# Patient Record
Sex: FEMALE | Race: WHITE | Employment: OTHER | URBAN - METROPOLITAN AREA
[De-identification: names, ages, dates, MRNs, and addresses within clinical notes are randomized per-mention and may not be internally consistent; named-entity substitution may affect disease eponyms.]

---

## 2017-12-20 ENCOUNTER — OP HISTORICAL/CONVERTED ENCOUNTER (OUTPATIENT)
Dept: OTHER | Age: 67
End: 2017-12-20

## 2017-12-28 ENCOUNTER — OP HISTORICAL/CONVERTED ENCOUNTER (OUTPATIENT)
Dept: OTHER | Age: 67
End: 2017-12-28

## 2018-01-10 ENCOUNTER — OP HISTORICAL/CONVERTED ENCOUNTER (OUTPATIENT)
Dept: OTHER | Age: 68
End: 2018-01-10

## 2018-01-29 ENCOUNTER — OP HISTORICAL/CONVERTED ENCOUNTER (OUTPATIENT)
Dept: OTHER | Age: 68
End: 2018-01-29

## 2018-01-31 ENCOUNTER — OP HISTORICAL/CONVERTED ENCOUNTER (OUTPATIENT)
Dept: OTHER | Age: 68
End: 2018-01-31

## 2021-12-28 RX ORDER — LOSARTAN POTASSIUM 50 MG/1
50 TABLET ORAL DAILY
COMMUNITY

## 2021-12-28 RX ORDER — HYDROCHLOROTHIAZIDE 12.5 MG/1
12.5 CAPSULE ORAL DAILY
COMMUNITY

## 2021-12-28 RX ORDER — TRAZODONE HYDROCHLORIDE 50 MG/1
50 TABLET ORAL
COMMUNITY
End: 2022-08-11

## 2021-12-28 RX ORDER — POTASSIUM CHLORIDE 750 MG/1
TABLET, FILM COATED, EXTENDED RELEASE ORAL
COMMUNITY
End: 2022-08-11

## 2021-12-28 RX ORDER — OMEPRAZOLE 40 MG/1
40 CAPSULE, DELAYED RELEASE ORAL DAILY
COMMUNITY

## 2021-12-28 RX ORDER — BISOPROLOL FUMARATE 5 MG/1
5 TABLET ORAL DAILY
COMMUNITY
End: 2022-08-11

## 2021-12-28 RX ORDER — FLUCONAZOLE 100 MG/1
100 TABLET ORAL DAILY
COMMUNITY
End: 2022-08-11

## 2021-12-28 RX ORDER — LORATADINE 10 MG/1
10 TABLET ORAL
COMMUNITY
End: 2022-08-11

## 2021-12-28 RX ORDER — CITALOPRAM 40 MG/1
40 TABLET, FILM COATED ORAL DAILY
COMMUNITY

## 2021-12-30 ENCOUNTER — HOSPITAL ENCOUNTER (OUTPATIENT)
Dept: PREADMISSION TESTING | Age: 71
Discharge: HOME | End: 2021-12-30
Payer: MEDICARE

## 2021-12-30 LAB — SARS-COV-2, COV2: NORMAL

## 2021-12-30 PROCEDURE — U0005 INFEC AGEN DETEC AMPLI PROBE: HCPCS

## 2022-01-01 LAB
SARS-COV-2, XPLCVT: NOT DETECTED
SOURCE, COVRS: NORMAL

## 2022-01-04 ENCOUNTER — HOSPITAL ENCOUNTER (OUTPATIENT)
Age: 72
Setting detail: OUTPATIENT SURGERY
Discharge: HOME | End: 2022-01-04
Attending: INTERNAL MEDICINE | Admitting: INTERNAL MEDICINE
Payer: MEDICARE

## 2022-01-04 ENCOUNTER — APPOINTMENT (OUTPATIENT)
Dept: ENDOSCOPY | Age: 72
End: 2022-01-04
Attending: INTERNAL MEDICINE
Payer: MEDICARE

## 2022-01-04 ENCOUNTER — ANESTHESIA EVENT (OUTPATIENT)
Dept: ENDOSCOPY | Age: 72
End: 2022-01-04
Payer: MEDICARE

## 2022-01-04 ENCOUNTER — ANESTHESIA (OUTPATIENT)
Dept: ENDOSCOPY | Age: 72
End: 2022-01-04
Payer: MEDICARE

## 2022-01-04 VITALS
SYSTOLIC BLOOD PRESSURE: 123 MMHG | WEIGHT: 162 LBS | HEART RATE: 70 BPM | DIASTOLIC BLOOD PRESSURE: 75 MMHG | TEMPERATURE: 97.7 F | RESPIRATION RATE: 16 BRPM | BODY MASS INDEX: 28.7 KG/M2 | HEIGHT: 63 IN | OXYGEN SATURATION: 98 %

## 2022-01-04 PROCEDURE — 76040000007: Performed by: INTERNAL MEDICINE

## 2022-01-04 PROCEDURE — 2709999900 HC NON-CHARGEABLE SUPPLY: Performed by: INTERNAL MEDICINE

## 2022-01-04 PROCEDURE — 77030021593 HC FCPS BIOP ENDOSC BSC -A: Performed by: INTERNAL MEDICINE

## 2022-01-04 PROCEDURE — 76060000032 HC ANESTHESIA 0.5 TO 1 HR: Performed by: INTERNAL MEDICINE

## 2022-01-04 PROCEDURE — 74011250636 HC RX REV CODE- 250/636: Performed by: NURSE ANESTHETIST, CERTIFIED REGISTERED

## 2022-01-04 PROCEDURE — 74011250636 HC RX REV CODE- 250/636: Performed by: INTERNAL MEDICINE

## 2022-01-04 PROCEDURE — 77030019988 HC FCPS ENDOSC DISP BSC -B: Performed by: INTERNAL MEDICINE

## 2022-01-04 PROCEDURE — 88305 TISSUE EXAM BY PATHOLOGIST: CPT

## 2022-01-04 RX ORDER — PROPOFOL 10 MG/ML
INJECTION, EMULSION INTRAVENOUS AS NEEDED
Status: DISCONTINUED | OUTPATIENT
Start: 2022-01-04 | End: 2022-01-04 | Stop reason: HOSPADM

## 2022-01-04 RX ORDER — SODIUM CHLORIDE, SODIUM LACTATE, POTASSIUM CHLORIDE, CALCIUM CHLORIDE 600; 310; 30; 20 MG/100ML; MG/100ML; MG/100ML; MG/100ML
50 INJECTION, SOLUTION INTRAVENOUS CONTINUOUS
Status: DISCONTINUED | OUTPATIENT
Start: 2022-01-04 | End: 2022-01-04 | Stop reason: HOSPADM

## 2022-01-04 RX ADMIN — PROPOFOL 100 MG: 10 INJECTION, EMULSION INTRAVENOUS at 09:31

## 2022-01-04 RX ADMIN — SODIUM CHLORIDE, POTASSIUM CHLORIDE, SODIUM LACTATE AND CALCIUM CHLORIDE 50 ML/HR: 600; 310; 30; 20 INJECTION, SOLUTION INTRAVENOUS at 08:56

## 2022-01-04 RX ADMIN — PROPOFOL 25 MG: 10 INJECTION, EMULSION INTRAVENOUS at 09:33

## 2022-01-04 NOTE — ANESTHESIA POSTPROCEDURE EVALUATION
Procedure(s):  ESOPHAGOGASTRODUODENOSCOPY (EGD)  ESOPHAGOGASTRODUODENAL (EGD) BIOPSY.     total IV anesthesia, MAC    Anesthesia Post Evaluation      Multimodal analgesia: multimodal analgesia not used between 6 hours prior to anesthesia start to PACU discharge  Patient location during evaluation: bedside  Patient participation: complete - patient participated  Level of consciousness: awake and alert  Pain score: 0  Pain management: adequate  Airway patency: patent  Anesthetic complications: no  Cardiovascular status: acceptable, hemodynamically stable and blood pressure returned to baseline  Respiratory status: acceptable, nonlabored ventilation, spontaneous ventilation, unassisted and room air  Hydration status: acceptable  Post anesthesia nausea and vomiting:  none  Final Post Anesthesia Temperature Assessment:  Normothermia (36.0-37.5 degrees C)      INITIAL Post-op Vital signs:   Vitals Value Taken Time   /75 01/04/22 1015   Temp     Pulse 70 01/04/22 1015   Resp 16 01/04/22 1015   SpO2 98 % 01/04/22 1015

## 2022-01-04 NOTE — ANESTHESIA PREPROCEDURE EVALUATION
Relevant Problems   No relevant active problems       Anesthetic History   No history of anesthetic complications            Review of Systems / Medical History  Patient summary reviewed, nursing notes reviewed and pertinent labs reviewed    Pulmonary        Sleep apnea (uses a mouth piece)        Comments: Chronic Bronchitis   Neuro/Psych         Psychiatric history     Cardiovascular    Hypertension                   GI/Hepatic/Renal     GERD           Endo/Other  Within defined limits           Other Findings              Physical Exam    Airway  Mallampati: III  TM Distance: 4 - 6 cm  Neck ROM: normal range of motion   Mouth opening: Normal     Cardiovascular    Rhythm: regular  Rate: normal         Dental  No notable dental hx       Pulmonary  Breath sounds clear to auscultation               Abdominal  GI exam deferred       Other Findings            Anesthetic Plan    ASA: 3  Anesthesia type: total IV anesthesia and MAC          Induction: Intravenous  Anesthetic plan and risks discussed with: Patient

## 2022-01-13 NOTE — PROGRESS NOTES
\"I am actually feeling better since I have had the procedure and I am following a diet for gastritis. I have a follow up on the 18th of this month. \"

## 2022-06-07 ENCOUNTER — ANESTHESIA (OUTPATIENT)
Dept: ENDOSCOPY | Age: 72
End: 2022-06-07
Payer: MEDICARE

## 2022-06-07 ENCOUNTER — APPOINTMENT (OUTPATIENT)
Dept: ENDOSCOPY | Age: 72
End: 2022-06-07
Attending: INTERNAL MEDICINE
Payer: MEDICARE

## 2022-06-07 ENCOUNTER — HOSPITAL ENCOUNTER (OUTPATIENT)
Age: 72
Setting detail: OUTPATIENT SURGERY
Discharge: HOME | End: 2022-06-07
Attending: INTERNAL MEDICINE | Admitting: INTERNAL MEDICINE
Payer: MEDICARE

## 2022-06-07 ENCOUNTER — ANESTHESIA EVENT (OUTPATIENT)
Dept: ENDOSCOPY | Age: 72
End: 2022-06-07
Payer: MEDICARE

## 2022-06-07 VITALS
OXYGEN SATURATION: 98 % | SYSTOLIC BLOOD PRESSURE: 112 MMHG | RESPIRATION RATE: 16 BRPM | WEIGHT: 165 LBS | DIASTOLIC BLOOD PRESSURE: 56 MMHG | TEMPERATURE: 97.9 F | BODY MASS INDEX: 30.36 KG/M2 | HEIGHT: 62 IN | HEART RATE: 68 BPM

## 2022-06-07 PROCEDURE — 76040000007: Performed by: INTERNAL MEDICINE

## 2022-06-07 PROCEDURE — 74011000250 HC RX REV CODE- 250

## 2022-06-07 PROCEDURE — 2709999900 HC NON-CHARGEABLE SUPPLY: Performed by: INTERNAL MEDICINE

## 2022-06-07 PROCEDURE — 76060000032 HC ANESTHESIA 0.5 TO 1 HR: Performed by: INTERNAL MEDICINE

## 2022-06-07 RX ORDER — SODIUM CHLORIDE 9 MG/ML
25 INJECTION, SOLUTION INTRAVENOUS CONTINUOUS
Status: DISCONTINUED | OUTPATIENT
Start: 2022-06-07 | End: 2022-06-07 | Stop reason: HOSPADM

## 2022-06-07 RX ORDER — LIDOCAINE HYDROCHLORIDE 20 MG/ML
INJECTION, SOLUTION EPIDURAL; INFILTRATION; INTRACAUDAL; PERINEURAL AS NEEDED
Status: DISCONTINUED | OUTPATIENT
Start: 2022-06-07 | End: 2022-06-07 | Stop reason: HOSPADM

## 2022-06-07 RX ORDER — SODIUM CHLORIDE, SODIUM LACTATE, POTASSIUM CHLORIDE, CALCIUM CHLORIDE 600; 310; 30; 20 MG/100ML; MG/100ML; MG/100ML; MG/100ML
75 INJECTION, SOLUTION INTRAVENOUS CONTINUOUS
Status: DISCONTINUED | OUTPATIENT
Start: 2022-06-07 | End: 2022-06-07 | Stop reason: HOSPADM

## 2022-06-07 RX ADMIN — LIDOCAINE HYDROCHLORIDE 10 MG: 20 INJECTION, SOLUTION EPIDURAL; INFILTRATION; INTRACAUDAL; PERINEURAL at 09:06

## 2022-06-07 RX ADMIN — LIDOCAINE HYDROCHLORIDE 10 MG: 20 INJECTION, SOLUTION EPIDURAL; INFILTRATION; INTRACAUDAL; PERINEURAL at 09:02

## 2022-06-07 RX ADMIN — LIDOCAINE HYDROCHLORIDE 100 MG: 20 INJECTION, SOLUTION EPIDURAL; INFILTRATION; INTRACAUDAL; PERINEURAL at 08:56

## 2022-06-07 RX ADMIN — LIDOCAINE HYDROCHLORIDE 10 MG: 20 INJECTION, SOLUTION EPIDURAL; INFILTRATION; INTRACAUDAL; PERINEURAL at 09:00

## 2022-06-07 RX ADMIN — LIDOCAINE HYDROCHLORIDE 10 MG: 20 INJECTION, SOLUTION EPIDURAL; INFILTRATION; INTRACAUDAL; PERINEURAL at 09:01

## 2022-06-07 RX ADMIN — LIDOCAINE HYDROCHLORIDE 10 MG: 20 INJECTION, SOLUTION EPIDURAL; INFILTRATION; INTRACAUDAL; PERINEURAL at 08:58

## 2022-06-07 NOTE — ANESTHESIA PREPROCEDURE EVALUATION
Relevant Problems   No relevant active problems       Anesthetic History   No history of anesthetic complications            Review of Systems / Medical History  Patient summary reviewed, nursing notes reviewed and pertinent labs reviewed    Pulmonary        Sleep apnea (uses a mouth piece)    Asthma     Comments: Chronic Bronchitis   Neuro/Psych         Psychiatric history     Cardiovascular    Hypertension                   GI/Hepatic/Renal     GERD           Endo/Other        Obesity     Other Findings            Past Medical History:   Diagnosis Date    Asthma     COVID 06/2021    Depression     GERD (gastroesophageal reflux disease)     H/O gastric bypass     Hypertension     Ill-defined condition     BRADYCARDIA    Pancreatitis     Skin cancer     precancerous    Sleep apnea        Past Surgical History:   Procedure Laterality Date    HX CHOLECYSTECTOMY      HX GI  2015    colonoscopy    HX GYN      partial hysterectomy    HX HERNIA REPAIR      HX UROLOGICAL         Current Outpatient Medications   Medication Instructions    bisoprolol (ZEBETA) 5 mg, Oral, DAILY    citalopram (CELEXA) 20 mg, Oral, DAILY    fluconazole (DIFLUCAN) 100 mg, DAILY    hydroCHLOROthiazide (HYDRODIURIL) 12.5 mg, Oral, DAILY    loratadine (CLARITIN) 10 mg    losartan (COZAAR) 50 mg, Oral, DAILY    omeprazole (PRILOSEC) 40 mg, Oral, DAILY    potassium chloride SR (Klor-Con 10) 10 mEq tablet Take  by mouth.  traZODone (DESYREL) 50 mg, Oral, EVERY BEDTIME       No current facility-administered medications for this encounter.        Patient Vitals for the past 24 hrs:   Temp Pulse Resp BP SpO2   06/07/22 0757 36.9 °C (98.5 °F) 65 20 138/67 99 %       No results found for: WBC, WBCLT, HGBPOC, HGB, HGBP, HCTPOC, HCT, PHCT, RBCH, PLT, MCV, HGBEXT, HCTEXT, PLTEXT  No results found for: NA, K, CL, CO2, AGAP, GLU, BUN, CREA, BUCR, GFRAA, GFRNA, CA, GFRAA  No results found for: APTT, PTP, INR, INREXT  No results found for: GLU, GLUCPOC  Physical Exam    Airway  Mallampati: III  TM Distance: 4 - 6 cm  Neck ROM: normal range of motion   Mouth opening: Normal     Cardiovascular    Rhythm: regular  Rate: normal         Dental  No notable dental hx       Pulmonary  Breath sounds clear to auscultation               Abdominal  GI exam deferred       Other Findings            Anesthetic Plan    ASA: 3  Anesthesia type: total IV anesthesia and MAC          Induction: Intravenous  Anesthetic plan and risks discussed with: Patient and Family

## 2022-06-07 NOTE — ROUTINE PROCESS
Pt recovered on unit. Reviewed AVS and pt able to teach back how to start a high fiber diet. Removed pt's IV with no complications. Pt discharged home safely with family.

## 2022-06-07 NOTE — ANESTHESIA POSTPROCEDURE EVALUATION
Procedure(s):  COLONOSCOPY.    total IV anesthesia, MAC    Anesthesia Post Evaluation      Multimodal analgesia: multimodal analgesia not used between 6 hours prior to anesthesia start to PACU discharge  Patient location during evaluation: bedside (Endoscopy suite)  Patient participation: complete - patient cannot participate  Level of consciousness: sleepy but conscious  Pain score: 0  Pain management: adequate  Airway patency: patent  Anesthetic complications: no  Cardiovascular status: acceptable  Respiratory status: acceptable and nasal cannula  Hydration status: acceptable  Comments: This patient remained on the stretcher. The patient was handed off to the endoscopy nursing team.  All questions regarding pre-, intra-, and postoperative care were answered.   Post anesthesia nausea and vomiting:  none      INITIAL Post-op Vital signs:   Vitals Value Taken Time   /57 06/07/22 0907   Temp 36.1 °C (97 °F) 06/07/22 0907   Pulse 56 06/07/22 0907   Resp 17 06/07/22 0907   SpO2 98 % 06/07/22 0907

## 2022-08-11 ENCOUNTER — APPOINTMENT (OUTPATIENT)
Dept: ULTRASOUND IMAGING | Age: 72
DRG: 446 | End: 2022-08-11
Attending: NURSE PRACTITIONER
Payer: MEDICARE

## 2022-08-11 ENCOUNTER — HOSPITAL ENCOUNTER (INPATIENT)
Age: 72
LOS: 2 days | Discharge: HOME | DRG: 446 | End: 2022-08-13
Attending: EMERGENCY MEDICINE | Admitting: INTERNAL MEDICINE
Payer: MEDICARE

## 2022-08-11 DIAGNOSIS — K83.8 COMMON BILE DUCT DILATATION: ICD-10-CM

## 2022-08-11 DIAGNOSIS — R10.11 RUQ PAIN: Primary | ICD-10-CM

## 2022-08-11 LAB
ABO + RH BLD: NORMAL
ALBUMIN SERPL-MCNC: 3 G/DL (ref 3.5–5)
ALBUMIN/GLOB SERPL: 0.9 {RATIO} (ref 1.1–2.2)
ALP SERPL-CCNC: 138 U/L (ref 45–117)
ALT SERPL-CCNC: 49 U/L (ref 12–78)
ANION GAP SERPL CALC-SCNC: 2 MMOL/L (ref 5–15)
AST SERPL W P-5'-P-CCNC: 43 U/L (ref 15–37)
ATRIAL RATE: 60 BPM
BASOPHILS # BLD: 0 K/UL (ref 0–0.1)
BASOPHILS NFR BLD: 1 % (ref 0–1)
BILIRUB SERPL-MCNC: 0.4 MG/DL (ref 0.2–1)
BLOOD GROUP ANTIBODIES SERPL: NEGATIVE
BUN SERPL-MCNC: 17 MG/DL (ref 6–20)
BUN/CREAT SERPL: 18 (ref 12–20)
CA-I BLD-MCNC: 8.8 MG/DL (ref 8.5–10.1)
CALCULATED P AXIS, ECG09: 78 DEGREES
CALCULATED R AXIS, ECG10: 54 DEGREES
CALCULATED T AXIS, ECG11: 54 DEGREES
CHLORIDE SERPL-SCNC: 109 MMOL/L (ref 97–108)
CO2 SERPL-SCNC: 31 MMOL/L (ref 21–32)
CREAT SERPL-MCNC: 0.94 MG/DL (ref 0.55–1.02)
DIAGNOSIS, 93000: NORMAL
DIFFERENTIAL METHOD BLD: ABNORMAL
EOSINOPHIL # BLD: 0.1 K/UL (ref 0–0.4)
EOSINOPHIL NFR BLD: 2 % (ref 0–7)
ERYTHROCYTE [DISTWIDTH] IN BLOOD BY AUTOMATED COUNT: 15.6 % (ref 11.5–14.5)
GLOBULIN SER CALC-MCNC: 3.3 G/DL (ref 2–4)
GLUCOSE SERPL-MCNC: 103 MG/DL (ref 65–100)
HCT VFR BLD AUTO: 31 % (ref 35–47)
HGB BLD-MCNC: 9.7 G/DL (ref 11.5–16)
IMM GRANULOCYTES # BLD AUTO: 0 K/UL (ref 0–0.04)
IMM GRANULOCYTES NFR BLD AUTO: 0 % (ref 0–0.5)
LIPASE SERPL-CCNC: 212 U/L (ref 73–393)
LYMPHOCYTES # BLD: 1.8 K/UL (ref 0.8–3.5)
LYMPHOCYTES NFR BLD: 40 % (ref 12–49)
MCH RBC QN AUTO: 25 PG (ref 26–34)
MCHC RBC AUTO-ENTMCNC: 31.3 G/DL (ref 30–36.5)
MCV RBC AUTO: 79.9 FL (ref 80–99)
MONOCYTES # BLD: 0.4 K/UL (ref 0–1)
MONOCYTES NFR BLD: 10 % (ref 5–13)
NEUTS SEG # BLD: 2.1 K/UL (ref 1.8–8)
NEUTS SEG NFR BLD: 47 % (ref 32–75)
NRBC # BLD: 0 K/UL (ref 0–0.01)
NRBC BLD-RTO: 0 PER 100 WBC
P-R INTERVAL, ECG05: 156 MS
PLATELET # BLD AUTO: 359 K/UL (ref 150–400)
PMV BLD AUTO: 10.5 FL (ref 8.9–12.9)
POTASSIUM SERPL-SCNC: 4.2 MMOL/L (ref 3.5–5.1)
PROT SERPL-MCNC: 6.3 G/DL (ref 6.4–8.2)
Q-T INTERVAL, ECG07: 446 MS
QRS DURATION, ECG06: 84 MS
QTC CALCULATION (BEZET), ECG08: 446 MS
RBC # BLD AUTO: 3.88 M/UL (ref 3.8–5.2)
SODIUM SERPL-SCNC: 142 MMOL/L (ref 136–145)
SPECIMEN EXP DATE BLD: NORMAL
TROPONIN-HIGH SENSITIVITY: 10 NG/L (ref 0–51)
TROPONIN-HIGH SENSITIVITY: 13 NG/L (ref 0–51)
VENTRICULAR RATE, ECG03: 60 BPM
WBC # BLD AUTO: 4.4 K/UL (ref 3.6–11)

## 2022-08-11 PROCEDURE — 86900 BLOOD TYPING SEROLOGIC ABO: CPT

## 2022-08-11 PROCEDURE — 84484 ASSAY OF TROPONIN QUANT: CPT

## 2022-08-11 PROCEDURE — 74011250637 HC RX REV CODE- 250/637: Performed by: PHYSICIAN ASSISTANT

## 2022-08-11 PROCEDURE — 94761 N-INVAS EAR/PLS OXIMETRY MLT: CPT

## 2022-08-11 PROCEDURE — 87506 IADNA-DNA/RNA PROBE TQ 6-11: CPT

## 2022-08-11 PROCEDURE — 93005 ELECTROCARDIOGRAM TRACING: CPT

## 2022-08-11 PROCEDURE — 74011000250 HC RX REV CODE- 250: Performed by: PHYSICIAN ASSISTANT

## 2022-08-11 PROCEDURE — 83690 ASSAY OF LIPASE: CPT

## 2022-08-11 PROCEDURE — 80053 COMPREHEN METABOLIC PANEL: CPT

## 2022-08-11 PROCEDURE — 36415 COLL VENOUS BLD VENIPUNCTURE: CPT

## 2022-08-11 PROCEDURE — 87177 OVA AND PARASITES SMEARS: CPT

## 2022-08-11 PROCEDURE — 99285 EMERGENCY DEPT VISIT HI MDM: CPT

## 2022-08-11 PROCEDURE — 65270000029 HC RM PRIVATE

## 2022-08-11 PROCEDURE — 76705 ECHO EXAM OF ABDOMEN: CPT

## 2022-08-11 PROCEDURE — 85025 COMPLETE CBC W/AUTO DIFF WBC: CPT

## 2022-08-11 RX ORDER — ONDANSETRON 2 MG/ML
4 INJECTION INTRAMUSCULAR; INTRAVENOUS
Status: DISCONTINUED | OUTPATIENT
Start: 2022-08-11 | End: 2022-08-13 | Stop reason: HOSPADM

## 2022-08-11 RX ORDER — HYDROCHLOROTHIAZIDE 25 MG/1
12.5 TABLET ORAL DAILY
Status: DISCONTINUED | OUTPATIENT
Start: 2022-08-12 | End: 2022-08-13 | Stop reason: HOSPADM

## 2022-08-11 RX ORDER — POLYETHYLENE GLYCOL 3350 17 G/17G
17 POWDER, FOR SOLUTION ORAL DAILY PRN
Status: DISCONTINUED | OUTPATIENT
Start: 2022-08-11 | End: 2022-08-13 | Stop reason: HOSPADM

## 2022-08-11 RX ORDER — ALBUTEROL SULFATE 90 UG/1
2 AEROSOL, METERED RESPIRATORY (INHALATION)
COMMUNITY

## 2022-08-11 RX ORDER — CITALOPRAM 20 MG/1
20 TABLET, FILM COATED ORAL DAILY
Status: DISCONTINUED | OUTPATIENT
Start: 2022-08-12 | End: 2022-08-13 | Stop reason: HOSPADM

## 2022-08-11 RX ORDER — SODIUM CHLORIDE 0.9 % (FLUSH) 0.9 %
5-40 SYRINGE (ML) INJECTION AS NEEDED
Status: DISCONTINUED | OUTPATIENT
Start: 2022-08-11 | End: 2022-08-13 | Stop reason: HOSPADM

## 2022-08-11 RX ORDER — ONDANSETRON 4 MG/1
4 TABLET, ORALLY DISINTEGRATING ORAL
Status: DISCONTINUED | OUTPATIENT
Start: 2022-08-11 | End: 2022-08-13 | Stop reason: HOSPADM

## 2022-08-11 RX ORDER — PANTOPRAZOLE SODIUM 40 MG/1
40 TABLET, DELAYED RELEASE ORAL DAILY
Status: DISCONTINUED | OUTPATIENT
Start: 2022-08-12 | End: 2022-08-13 | Stop reason: HOSPADM

## 2022-08-11 RX ORDER — ENOXAPARIN SODIUM 100 MG/ML
40 INJECTION SUBCUTANEOUS DAILY
Status: DISCONTINUED | OUTPATIENT
Start: 2022-08-12 | End: 2022-08-13 | Stop reason: HOSPADM

## 2022-08-11 RX ORDER — LOSARTAN POTASSIUM 50 MG/1
50 TABLET ORAL DAILY
Status: DISCONTINUED | OUTPATIENT
Start: 2022-08-12 | End: 2022-08-13 | Stop reason: HOSPADM

## 2022-08-11 RX ORDER — SODIUM CHLORIDE 0.9 % (FLUSH) 0.9 %
5-40 SYRINGE (ML) INJECTION EVERY 8 HOURS
Status: DISCONTINUED | OUTPATIENT
Start: 2022-08-11 | End: 2022-08-13 | Stop reason: HOSPADM

## 2022-08-11 RX ORDER — ACETAMINOPHEN 650 MG/1
650 SUPPOSITORY RECTAL
Status: DISCONTINUED | OUTPATIENT
Start: 2022-08-11 | End: 2022-08-13 | Stop reason: HOSPADM

## 2022-08-11 RX ORDER — TRAZODONE HYDROCHLORIDE 50 MG/1
50 TABLET ORAL
Status: DISCONTINUED | OUTPATIENT
Start: 2022-08-11 | End: 2022-08-12

## 2022-08-11 RX ORDER — ACETAMINOPHEN 325 MG/1
650 TABLET ORAL
Status: DISCONTINUED | OUTPATIENT
Start: 2022-08-11 | End: 2022-08-13 | Stop reason: HOSPADM

## 2022-08-11 RX ADMIN — SODIUM CHLORIDE, PRESERVATIVE FREE 10 ML: 5 INJECTION INTRAVENOUS at 15:45

## 2022-08-11 RX ADMIN — SODIUM CHLORIDE, PRESERVATIVE FREE 10 ML: 5 INJECTION INTRAVENOUS at 23:15

## 2022-08-11 RX ADMIN — ACETAMINOPHEN 650 MG: 325 TABLET, FILM COATED ORAL at 23:23

## 2022-08-11 NOTE — H&P
History and Physical    Patient: Robbi García MRN: [id number]  SSN: [id number]    YOB: 1950  Age: 66 y.o. Sex: female      Subjective:      Robbi García is a 66 y.o. female with a past medical history of hypertension, depression, GERD, history of gastric bypass surgery that presented to the emergency room on 8/11/2022 for right upper quadrant abdominal pain. Patient had a cholecystectomy and gastric bypass over 10 years ago. Patient says for approximately 1 month she has been having very loose watery diarrhea. She also admits to right upper quadrant pain and soreness that she thinks is related due to eating after 1 hour. She denied any fevers, chills, night sweats. She thinks eating aggravates it but is unsure. There is no alleviating factors. Patient is post to have a hemorrhoidectomy by Dr. Jillian Owen on 24th. In the ED abdominal ultrasound showed mildly dilated edition of the CBD with no evidence of stone. Laboratory data was significant for WBC of 9.7, lipase 212, bili total 0.4, ALT 49, AST 43, alkaline phosphatase 138. Troponin x1 negative.   It was recommended admission for GI evaluation and MRCP    Past Medical History:   Diagnosis Date    Asthma     COVID 06/2021    Depression     GERD (gastroesophageal reflux disease)     H/O gastric bypass     Hypertension     Ill-defined condition     BRADYCARDIA    Pancreatitis     Skin cancer     precancerous    Sleep apnea      Past Surgical History:   Procedure Laterality Date    COLONOSCOPY N/A 6/7/2022    COLONOSCOPY performed by [facility] at [facility] ENDOSCOPY    HX CHOLECYSTECTOMY      HX GI  2015    colonoscopy    HX GYN      partial hysterectomy    HX HERNIA REPAIR      HX UROLOGICAL        Family History   Problem Relation Age of Onset    Cancer Other         grandmother- colon cancer    Diabetes Other     Coronary Art Dis Other      Social History     Tobacco Use    Smoking status: Never    Smokeless tobacco: Never   Substance Use Topics Alcohol use: Yes     Comment: wine socially       Prior to Admission medications    Medication Sig Start Date End Date Taking? Authorizing Provider   traZODone (DESYREL) 50 mg tablet Take 50 mg by mouth nightly. Provider, Historical   losartan (COZAAR) 50 mg tablet Take 50 mg by mouth daily. Provider, Historical   hydroCHLOROthiazide (HYDRODIURIL) 12.5 mg tablet Take 12.5 mg by mouth daily. Provider, Historical   citalopram (CELEXA) 20 mg tablet Take 20 mg by mouth daily. Provider, Historical   bisoprolol (ZEBETA) 5 mg tablet Take 5 mg by mouth daily. Provider, Historical   omeprazole (PRILOSEC) 40 mg capsule Take 40 mg by mouth daily. Provider, Historical        Allergies   Allergen Reactions    Codeine Other (comments)     Pt states \"It causes her to have Pancreatitis\"       Review of Systems:  Constitutional: No fevers, No chills, ++ fatigue, ++ weakness  Eyes: No visual disturbance  Ears, Nose, Mouth, Throat, and Face: No nasal congestion, No sore throat  Respiratory: No cough, No sputum, No wheezing, No SOB  Cardiovascular: No chest pain, No lower extremity edema, No Palpitations   Gastrointestinal: No nausea, No vomiting, ++diarrhea, No constipation, ++abdominal pain  Genitourinary: No frequency, No dysuria, No hematuria  Integument/Breast: No rash, No skin lesion(s), No dryness  Musculoskeletal: No arthralgias, No neck pain, No back pain  Neurological: No headaches, No dizziness, No confusion,  No seizures  Behavioral/Psychiatric: No anxiety, No depression      Objective:     Vitals:    08/11/22 1235 08/11/22 1246 08/11/22 1330 08/11/22 1441   BP: 137/67  (!) 146/72 (!) 111/97   Pulse: 62  (!) 59 63   Resp: 20   20   Temp: 98.2 °F (36.8 °C)   98.2 °F (36.8 °C)   SpO2: 98% 100% 97% 100%   Weight:       Height:            Physical Exam:  General: alert, cooperative, no distress  Eye: conjunctivae/corneas clear. PERRL, EOM's intact.    Throat and Neck: normal and no erythema or exudates noted. No mass   Lung: clear to auscultation bilaterally  Heart: regular rate and rhythm,   Abdomen: soft, RUQ tender. Bowel sounds normal. No masses,  Extremities:  able to move all extremities normal, atraumatic  Skin: Normal.  Neurologic: AOx3. Cranial nerves 2-12 and sensation grossly intact. Psychiatric: non focal    Recent Results (from the past 24 hour(s))   CBC WITH AUTOMATED DIFF    Collection Time: 08/11/22 12:46 PM   Result Value Ref Range    WBC 4.4 3.6 - 11.0 K/uL    RBC 3.88 3.80 - 5.20 M/uL    HGB 9.7 (L) 11.5 - 16.0 g/dL    HCT 31.0 (L) 35.0 - 47.0 %    MCV 79.9 (L) 80.0 - 99.0 FL    MCH 25.0 (L) 26.0 - 34.0 PG    MCHC 31.3 30.0 - 36.5 g/dL    RDW 15.6 (H) 11.5 - 14.5 %    PLATELET 502 232 - 103 K/uL    MPV 10.5 8.9 - 12.9 FL    NRBC 0.0 0.0  WBC    ABSOLUTE NRBC 0.00 0.00 - 0.01 K/uL    NEUTROPHILS 47 32 - 75 %    LYMPHOCYTES 40 12 - 49 %    MONOCYTES 10 5 - 13 %    EOSINOPHILS 2 0 - 7 %    BASOPHILS 1 0 - 1 %    IMMATURE GRANULOCYTES 0 0 - 0.5 %    ABS. NEUTROPHILS 2.1 1.8 - 8.0 K/UL    ABS. LYMPHOCYTES 1.8 0.8 - 3.5 K/UL    ABS. MONOCYTES 0.4 0.0 - 1.0 K/UL    ABS. EOSINOPHILS 0.1 0.0 - 0.4 K/UL    ABS. BASOPHILS 0.0 0.0 - 0.1 K/UL    ABS. IMM. GRANS. 0.0 0.00 - 0.04 K/UL    DF AUTOMATED     METABOLIC PANEL, COMPREHENSIVE    Collection Time: 08/11/22 12:46 PM   Result Value Ref Range    Sodium 142 136 - 145 mmol/L    Potassium 4.2 3.5 - 5.1 mmol/L    Chloride 109 (H) 97 - 108 mmol/L    CO2 31 21 - 32 mmol/L    Anion gap 2 (L) 5 - 15 mmol/L    Glucose 103 (H) 65 - 100 mg/dL    BUN 17 6 - 20 mg/dL    Creatinine 0.94 0.55 - 1.02 mg/dL    BUN/Creatinine ratio 18 12 - 20      GFR est AA >60 >60 ml/min/1.73m2    GFR est non-AA 58 (L) >60 ml/min/1.73m2    Calcium 8.8 8.5 - 10.1 mg/dL    Bilirubin, total 0.4 0.2 - 1.0 mg/dL    AST (SGOT) 43 (H) 15 - 37 U/L    ALT (SGPT) 49 12 - 78 U/L    Alk.  phosphatase 138 (H) 45 - 117 U/L    Protein, total 6.3 (L) 6.4 - 8.2 g/dL    Albumin 3.0 (L) 3.5 - 5.0 g/dL    Globulin 3.3 2.0 - 4.0 g/dL    A-G Ratio 0.9 (L) 1.1 - 2.2     TYPE & SCREEN    Collection Time: 08/11/22 12:46 PM   Result Value Ref Range    Crossmatch Expiration 08/14/2022,2359     ABO/Rh(D) O Positive     Antibody screen Negative    LIPASE    Collection Time: 08/11/22 12:46 PM   Result Value Ref Range    Lipase 212 73 - 393 U/L   TROPONIN-HIGH SENSITIVITY    Collection Time: 08/11/22 12:46 PM   Result Value Ref Range    Troponin-High Sensitivity 10 0 - 51 ng/L       XR Results (maximum last 3): No results found for this or any previous visit. CT Results (maximum last 3): No results found for this or any previous visit. MRI Results (maximum last 3): No results found for this or any previous visit. Nuclear Medicine Results (maximum last 3): No results found for this or any previous visit. US Results (maximum last 3): Results from Hospital Encounter encounter on 08/11/22    US RUQ    Narrative  INDICATION: RUQ pain, dilated CBD on CT done last night    COMPARISON: CT abdomen/pelvis December 28, 2017    TECHNIQUE:  Routine ultrasound images of the RIGHT upper quadrant of the abdomen were  obtained. FINDINGS:  LIVER: No significant enlargement or evidence of parenchymal lesion. Liver  measures 15 cm longitudinal.  MAIN PORTAL VEIN: Patent with appropriate hepatopetal flow direction. GALLBLADDER: Patient is status post cholecystectomy. COMMON BILE DUCT: 1.1 centimeters in diameter. No evidence of CBD stone is  given. PANCREAS: Not well visualized due to bowel gas though visualized portions are  unremarkable. RIGHT KIDNEY: 11.1-3.8% 3.8 cm in length. No hydronephrosis or nephrolithiasis. OTHER: The IVC and the aorta are normal in appearance. The distal aorta measures  1.4 cm diameter. Impression  1. Mild dilatation of the CBD which may be related to the history of  cholecystectomy. No evidence of CBD stone is given.     2. Normal sonographic appearance of the liver and the RIGHT kidney. Assessment:   1. Dilated common bile duct/right upper quadrant abdominal pain  2. Hypertension with hypotensive episodes  3. GERD  4. Depression/anxiety    Plan:   1. Abdominal ultrasound showed dilated common bile duct with no stone. Bili total within normal limits. Liver function tests are slightly elevated with an AST of 43 and alkaline phosphatase of 138. Lipase within normal limits. We will get an MRCP. Gastroenterology consulted. We will send for stool samples  2. Blood pressure is on the low to normal side and bradycardic. We will hold patient's beta-blocker. We will continue with losartan and hydrochlorothiazide with holding parameters. Continue to monitor per unit protocol. On cardiac telemetry. Initial troponin negative. Will get EKG. Cycle 3 more sets. 3.  Prilosec  4. Celexa  5. CBC, CMP in a.m.       Full code    DVT prophylaxis Lovenox  GI prophylaxis Prilosec    Total time: 61 min     Signed By: Bruce Simeon PA-C     August 11, 2022

## 2022-08-11 NOTE — ED TRIAGE NOTES
Patient is a transfer from [facility] emergency department started with right upper quadrant abdominal pain, been going on for couple weeks. Sent for admission. Gerd related complaint, ercp, GI. Paperwork on chart from other facility.

## 2022-08-11 NOTE — PROGRESS NOTES
Admission Medication Reconciliation:    Information obtained from:  Pharmacy [facility])    Comments/Recommendations: Reviewed PTA medications and patient's allergies. Removed bisoprolol 5 mg  Removed trazodone 50 mg        Allergies:  Codeine    Significant PMH/Disease States:   Past Medical History:   Diagnosis Date    Asthma     COVID 06/2021    Depression     GERD (gastroesophageal reflux disease)     H/O gastric bypass     Hypertension     Ill-defined condition     BRADYCARDIA    Pancreatitis     Skin cancer     precancerous    Sleep apnea      Chief Complaint for this Admission:    Chief Complaint   Patient presents with    Abdominal Pain     Prior to Admission Medications:   Prior to Admission Medications   Prescriptions Last Dose Informant Patient Reported? Taking? albuterol (PROVENTIL HFA, VENTOLIN HFA, PROAIR HFA) 90 mcg/actuation inhaler  Other Yes Yes   Sig: Take 2 Puffs by inhalation every six (6) hours as needed for Wheezing. citalopram (CELEXA) 40 mg tablet  Other Yes Yes   Sig: Take 40 mg by mouth in the morning. hydroCHLOROthiazide (MICROZIDE) 12.5 mg capsule  Other Yes Yes   Sig: Take 12.5 mg by mouth daily. losartan (COZAAR) 50 mg tablet  Other Yes Yes   Sig: Take 50 mg by mouth daily. omeprazole (PRILOSEC) 40 mg capsule  Other Yes Yes   Sig: Take 40 mg by mouth daily.       Facility-Administered Medications: None       Rivka Mancini

## 2022-08-11 NOTE — ED PROVIDER NOTES
EMERGENCY DEPARTMENT HISTORY AND PHYSICAL EXAM      Date: 8/11/2022  Patient Name: Obed Fairbanks    History of Presenting Illness     Chief Complaint   Patient presents with    Abdominal Pain       History Provided By: Patient    HPI: Obed Fairbanks, 66 y.o. female with a significant past medical history of cholecystectomy 12 years ago and other history as reviewed and listed below presents to the ED transfer from [facility] for right upper quadrant pain, dilated CBD for MRCP. Patient was accepted last night for right upper quadrant pain with abnormal findings on CT of dilated common bile duct. She states that she has been having right upper quadrant pain occurring approximately an hour after eating. She currently does not have any pain except for on deep palpation, and denies nausea or vomiting, chest pain or shortness of breath. She sees  for GI and Dr. Yanira Chang did her cholecystectomy as well as her gastric bypass. There are no other complaints, changes, or physical findings at this time. PCP: Chary Thomson NP    No current facility-administered medications on file prior to encounter. Current Outpatient Medications on File Prior to Encounter   Medication Sig Dispense Refill    traZODone (DESYREL) 50 mg tablet Take 50 mg by mouth nightly. fluconazole (DIFLUCAN) 100 mg tablet Take 100 mg by mouth daily. FDA advises cautious prescribing of oral fluconazole in pregnancy. (Patient not taking: Reported on 1/4/2022)      losartan (COZAAR) 50 mg tablet Take 50 mg by mouth daily. potassium chloride SR (Klor-Con 10) 10 mEq tablet Take  by mouth. (Patient not taking: Reported on 1/4/2022)      hydroCHLOROthiazide (HYDRODIURIL) 12.5 mg tablet Take 12.5 mg by mouth daily. loratadine (Claritin) 10 mg tablet Take 10 mg by mouth. (Patient not taking: Reported on 1/4/2022)      citalopram (CELEXA) 20 mg tablet Take 20 mg by mouth daily.       bisoprolol (ZEBETA) 5 mg tablet Take 5 mg by mouth daily. omeprazole (PRILOSEC) 40 mg capsule Take 40 mg by mouth daily. Past History     Past Medical History:  Past Medical History:   Diagnosis Date    Asthma     COVID 06/2021    Depression     GERD (gastroesophageal reflux disease)     H/O gastric bypass     Hypertension     Ill-defined condition     BRADYCARDIA    Pancreatitis     Skin cancer     precancerous    Sleep apnea        Past Surgical History:  Past Surgical History:   Procedure Laterality Date    COLONOSCOPY N/A 6/7/2022    COLONOSCOPY performed by Danna Mejía MD at [address] [place] [address] ENDOSCOPY    HX CHOLECYSTECTOMY      HX GI  2015    colonoscopy    HX GYN      partial hysterectomy    HX HERNIA REPAIR      HX UROLOGICAL         Family History:  Family History   Problem Relation Age of Onset    Cancer Other         grandmother- colon cancer    Diabetes Other     Coronary Art Dis Other        Social History:  Social History     Tobacco Use    Smoking status: Never    Smokeless tobacco: Never   Substance Use Topics    Alcohol use: Yes     Comment: wine socially     Drug use: Never       Allergies: Allergies   Allergen Reactions    Codeine Other (comments)     Pt states \"It causes her to have Pancreatitis\"       Review of Systems   Review of Systems   Constitutional: Negative. HENT: Negative. Respiratory: Negative. Cardiovascular: Negative. Gastrointestinal:  Positive for abdominal pain. Genitourinary: Negative. Musculoskeletal: Negative. Skin: Negative. Neurological: Negative. Hematological: Negative. Psychiatric/Behavioral: Negative. All other systems reviewed and are negative. Physical Exam   Physical Exam  Vitals and nursing note reviewed. Constitutional:       General: She is not in acute distress. Appearance: She is well-developed and normal weight. She is not ill-appearing, toxic-appearing or diaphoretic. HENT:      Head: Normocephalic.       Mouth/Throat:      Mouth: Mucous membranes are moist.   Eyes:      General: No scleral icterus. Cardiovascular:      Rate and Rhythm: Normal rate and regular rhythm. Heart sounds: Normal heart sounds. Pulmonary:      Effort: Pulmonary effort is normal. No respiratory distress. Breath sounds: Normal breath sounds. Abdominal:      General: Abdomen is flat. Bowel sounds are normal.      Palpations: Abdomen is soft. Tenderness: There is abdominal tenderness in the right upper quadrant. There is no guarding. Negative signs include Hodges's sign. Skin:     General: Skin is warm and dry. Capillary Refill: Capillary refill takes less than 2 seconds. Neurological:      General: No focal deficit present. Mental Status: She is alert and oriented to person, place, and time. Psychiatric:         Behavior: Behavior normal.     Lab and Diagnostic Study Results   Labs -     Recent Results (from the past 12 hour(s))   CBC WITH AUTOMATED DIFF    Collection Time: 08/11/22 12:46 PM   Result Value Ref Range    WBC 4.4 3.6 - 11.0 K/uL    RBC 3.88 3.80 - 5.20 M/uL    HGB 9.7 (L) 11.5 - 16.0 g/dL    HCT 31.0 (L) 35.0 - 47.0 %    MCV 79.9 (L) 80.0 - 99.0 FL    MCH 25.0 (L) 26.0 - 34.0 PG    MCHC 31.3 30.0 - 36.5 g/dL    RDW 15.6 (H) 11.5 - 14.5 %    PLATELET 572 163 - 315 K/uL    MPV 10.5 8.9 - 12.9 FL    NRBC 0.0 0.0  WBC    ABSOLUTE NRBC 0.00 0.00 - 0.01 K/uL    NEUTROPHILS 47 32 - 75 %    LYMPHOCYTES 40 12 - 49 %    MONOCYTES 10 5 - 13 %    EOSINOPHILS 2 0 - 7 %    BASOPHILS 1 0 - 1 %    IMMATURE GRANULOCYTES 0 0 - 0.5 %    ABS. NEUTROPHILS 2.1 1.8 - 8.0 K/UL    ABS. LYMPHOCYTES 1.8 0.8 - 3.5 K/UL    ABS. MONOCYTES 0.4 0.0 - 1.0 K/UL    ABS. EOSINOPHILS 0.1 0.0 - 0.4 K/UL    ABS. BASOPHILS 0.0 0.0 - 0.1 K/UL    ABS. IMM.  GRANS. 0.0 0.00 - 0.04 K/UL    DF AUTOMATED     METABOLIC PANEL, COMPREHENSIVE    Collection Time: 08/11/22 12:46 PM   Result Value Ref Range    Sodium 142 136 - 145 mmol/L    Potassium 4.2 3.5 - 5.1 mmol/L Chloride 109 (H) 97 - 108 mmol/L    CO2 31 21 - 32 mmol/L    Anion gap 2 (L) 5 - 15 mmol/L    Glucose 103 (H) 65 - 100 mg/dL    BUN 17 6 - 20 mg/dL    Creatinine 0.94 0.55 - 1.02 mg/dL    BUN/Creatinine ratio 18 12 - 20      GFR est AA >60 >60 ml/min/1.73m2    GFR est non-AA 58 (L) >60 ml/min/1.73m2    Calcium 8.8 8.5 - 10.1 mg/dL    Bilirubin, total 0.4 0.2 - 1.0 mg/dL    AST (SGOT) 43 (H) 15 - 37 U/L    ALT (SGPT) 49 12 - 78 U/L    Alk. phosphatase 138 (H) 45 - 117 U/L    Protein, total 6.3 (L) 6.4 - 8.2 g/dL    Albumin 3.0 (L) 3.5 - 5.0 g/dL    Globulin 3.3 2.0 - 4.0 g/dL    A-G Ratio 0.9 (L) 1.1 - 2.2     TYPE & SCREEN    Collection Time: 08/11/22 12:46 PM   Result Value Ref Range    Crossmatch Expiration 08/14/2022,2359     ABO/Rh(D) Lorrie Cogan Positive     Antibody screen Negative    LIPASE    Collection Time: 08/11/22 12:46 PM   Result Value Ref Range    Lipase 212 73 - 393 U/L   TROPONIN-HIGH SENSITIVITY    Collection Time: 08/11/22 12:46 PM   Result Value Ref Range    Troponin-High Sensitivity 10 0 - 51 ng/L       Radiologic Studies -   @[email or web address]@  CT Results  (Last 48 hours)      None          CXR Results  (Last 48 hours)      None            Medical Decision Making and ED Course   - I am the first provider for this patient. I reviewed the vital signs, available nursing notes, past medical history, past surgical history, family history and social history. - Initial assessment performed. The patients presenting problems have been discussed, and they are in agreement with the care plan formulated and outlined with them. I have encouraged them to ask questions as they arise throughout their visit. Vital Signs-Reviewed the patient's vital signs.   Patient Vitals for the past 12 hrs:   Temp Pulse Resp BP SpO2   08/11/22 1441 98.2 °F (36.8 °C) 63 20 (!) 111/97 100 %   08/11/22 1330 -- (!) 59 -- (!) 146/72 97 %   08/11/22 1246 -- -- -- -- 100 %   08/11/22 1235 98.2 °F (36.8 °C) 62 20 137/67 98 % Differential Diagnosis & Medical Decision Making Provider Note:   17-year-old female presents as transfer for right upper quadrant pain. Differential diagnoses include, bile duct stone, pancreatitis, gastritis which are consistent with her postprandial reports of pain. Less likely cardiac in nature but will check EKG and troponin as patient is female over the age of 48. So we will check right upper quadrant ultrasound and repeat labs were done upon arrival after triage    ED Course:   ED Course as of 08/11/22 1523   Thu Aug 11, 2022   1521 Additional labs resulted and EKG reviewed by Dr. Akosua Santos. RUQ US resulted. Dr. Mark Ren, Hospitalist, consulted for admission with GI consult Dr. Kylee Cross who was notified upon patient's arrival and patient to have MRCP as inpatient. [KR]      ED Course User Index  [KR] Caryle Bevel, NP     Disposition   Disposition: Condition stable  Admitted to Floor Medical Floor the case was discussed with the admitting physician Dr. Mark Ren and Hospitalist services. Decision to Admit         Diagnosis/Clinical Impression     Clinical Impression:   1. RUQ pain    2. Common bile duct dilatation        Attestations: Winston BERKOWITZ NP, am the primary clinician of record. Please note that this dictation was completed with Tapomat, the computer voice recognition software. Quite often unanticipated grammatical, syntax, homophones, and other interpretive errors are inadvertently transcribed by the computer software. Please disregard these errors. Please excuse any errors that have escaped final proofreading. Thank you.

## 2022-08-11 NOTE — PROGRESS NOTES
Reason for Admission:  RUQ Pain                     RUR Score:   7%                  Plan for utilizing home health: Not at this time         PCP: First and Last name:  Brian Hernandez NP     Name of Practice:    Are you a current patient: Yes/No:    Approximate date of last visit: 2 months ago   Can you participate in a virtual visit with your PCP:                     Current Advanced Directive/Advance Care Plan: Full Code      Healthcare Decision Maker:   Click here to complete [address] including selection of the Healthcare Decision Maker Relationship (ie \"Primary\")      Zulma Wang, , [phone]                       Transition of Care Plan:      Met f/f with Pt, she confirmed that the information on the face sheet is correct. Pt stated that she lives with her . Pt stated no HH, no DME and independent with ADL    Pt stated that she uses [address] in [address] [place] [address] stated that family will give her a ride home when she is D/C. CM dispo: home with no needs at this time.

## 2022-08-12 ENCOUNTER — APPOINTMENT (OUTPATIENT)
Dept: MRI IMAGING | Age: 72
DRG: 446 | End: 2022-08-12
Attending: PHYSICIAN ASSISTANT
Payer: MEDICARE

## 2022-08-12 LAB
ALBUMIN SERPL-MCNC: 3 G/DL (ref 3.5–5)
ALBUMIN/GLOB SERPL: 1.1 {RATIO} (ref 1.1–2.2)
ALP SERPL-CCNC: 130 U/L (ref 45–117)
ALT SERPL-CCNC: 41 U/L (ref 12–78)
ANION GAP SERPL CALC-SCNC: 5 MMOL/L (ref 5–15)
AST SERPL W P-5'-P-CCNC: 31 U/L (ref 15–37)
ATRIAL RATE: 75 BPM
BASOPHILS # BLD: 0 K/UL (ref 0–0.1)
BASOPHILS NFR BLD: 0 % (ref 0–1)
BILIRUB DIRECT SERPL-MCNC: <0.1 MG/DL (ref 0–0.2)
BILIRUB SERPL-MCNC: 0.4 MG/DL (ref 0.2–1)
BUN SERPL-MCNC: 15 MG/DL (ref 6–20)
BUN/CREAT SERPL: 17 (ref 12–20)
C DIFF TOX GENS STL QL NAA+PROBE: NEGATIVE
CA-I BLD-MCNC: 8.9 MG/DL (ref 8.5–10.1)
CALCULATED P AXIS, ECG09: 84 DEGREES
CALCULATED R AXIS, ECG10: 45 DEGREES
CALCULATED T AXIS, ECG11: 38 DEGREES
CAMPYLOBACTER SPECIES, DNA: NEGATIVE
CHLORIDE SERPL-SCNC: 106 MMOL/L (ref 97–108)
CO2 SERPL-SCNC: 31 MMOL/L (ref 21–32)
CREAT SERPL-MCNC: 0.9 MG/DL (ref 0.55–1.02)
DIAGNOSIS, 93000: NORMAL
DIFFERENTIAL METHOD BLD: ABNORMAL
ENTEROTOXIGEN E COLI, DNA: NEGATIVE
EOSINOPHIL # BLD: 0.1 K/UL (ref 0–0.4)
EOSINOPHIL NFR BLD: 2 % (ref 0–7)
ERYTHROCYTE [DISTWIDTH] IN BLOOD BY AUTOMATED COUNT: 15.6 % (ref 11.5–14.5)
GLOBULIN SER CALC-MCNC: 2.7 G/DL (ref 2–4)
GLUCOSE SERPL-MCNC: 97 MG/DL (ref 65–100)
HCT VFR BLD AUTO: 31 % (ref 35–47)
HGB BLD-MCNC: 9.6 G/DL (ref 11.5–16)
IMM GRANULOCYTES # BLD AUTO: 0 K/UL (ref 0–0.04)
IMM GRANULOCYTES NFR BLD AUTO: 0 % (ref 0–0.5)
LYMPHOCYTES # BLD: 2.2 K/UL (ref 0.8–3.5)
LYMPHOCYTES NFR BLD: 49 % (ref 12–49)
MCH RBC QN AUTO: 24.8 PG (ref 26–34)
MCHC RBC AUTO-ENTMCNC: 31 G/DL (ref 30–36.5)
MCV RBC AUTO: 80.1 FL (ref 80–99)
MONOCYTES # BLD: 0.5 K/UL (ref 0–1)
MONOCYTES NFR BLD: 10 % (ref 5–13)
MRSA DNA SPEC QL NAA+PROBE: DETECTED
NEUTS SEG # BLD: 1.8 K/UL (ref 1.8–8)
NEUTS SEG NFR BLD: 39 % (ref 32–75)
NRBC # BLD: 0 K/UL (ref 0–0.01)
NRBC BLD-RTO: 0 PER 100 WBC
P SHIGELLOIDES DNA STL QL NAA+PROBE: NEGATIVE
P-R INTERVAL, ECG05: 160 MS
PLATELET # BLD AUTO: 329 K/UL (ref 150–400)
PMV BLD AUTO: 11 FL (ref 8.9–12.9)
POTASSIUM SERPL-SCNC: 3.7 MMOL/L (ref 3.5–5.1)
PROT SERPL-MCNC: 5.7 G/DL (ref 6.4–8.2)
Q-T INTERVAL, ECG07: 422 MS
QRS DURATION, ECG06: 92 MS
QTC CALCULATION (BEZET), ECG08: 471 MS
RBC # BLD AUTO: 3.87 M/UL (ref 3.8–5.2)
SALMONELLA SPECIES, DNA: NEGATIVE
SHIGA TOXIN PRODUCING, DNA: NEGATIVE
SHIGELLA SP+EIEC IPAH STL QL NAA+PROBE: NEGATIVE
SODIUM SERPL-SCNC: 142 MMOL/L (ref 136–145)
VENTRICULAR RATE, ECG03: 75 BPM
VIBRIO SPECIES, DNA: NEGATIVE
WBC # BLD AUTO: 4.6 K/UL (ref 3.6–11)
Y. ENTEROCOLITICA, DNA: NEGATIVE

## 2022-08-12 PROCEDURE — 80048 BASIC METABOLIC PNL TOTAL CA: CPT

## 2022-08-12 PROCEDURE — 87493 C DIFF AMPLIFIED PROBE: CPT

## 2022-08-12 PROCEDURE — 74011250636 HC RX REV CODE- 250/636: Performed by: PHYSICIAN ASSISTANT

## 2022-08-12 PROCEDURE — 80076 HEPATIC FUNCTION PANEL: CPT

## 2022-08-12 PROCEDURE — 36415 COLL VENOUS BLD VENIPUNCTURE: CPT

## 2022-08-12 PROCEDURE — 74011000250 HC RX REV CODE- 250: Performed by: PHYSICIAN ASSISTANT

## 2022-08-12 PROCEDURE — 87641 MR-STAPH DNA AMP PROBE: CPT

## 2022-08-12 PROCEDURE — 65270000029 HC RM PRIVATE

## 2022-08-12 PROCEDURE — 74011250637 HC RX REV CODE- 250/637: Performed by: PHYSICIAN ASSISTANT

## 2022-08-12 PROCEDURE — 85025 COMPLETE CBC W/AUTO DIFF WBC: CPT

## 2022-08-12 PROCEDURE — 74181 MRI ABDOMEN W/O CONTRAST: CPT

## 2022-08-12 RX ORDER — LOPERAMIDE HYDROCHLORIDE 2 MG/1
2 CAPSULE ORAL
Status: DISCONTINUED | OUTPATIENT
Start: 2022-08-12 | End: 2022-08-13 | Stop reason: HOSPADM

## 2022-08-12 RX ADMIN — ACETAMINOPHEN 650 MG: 325 TABLET, FILM COATED ORAL at 05:48

## 2022-08-12 RX ADMIN — HYDROCHLOROTHIAZIDE 12.5 MG: 25 TABLET ORAL at 08:27

## 2022-08-12 RX ADMIN — LOSARTAN POTASSIUM 50 MG: 50 TABLET, FILM COATED ORAL at 08:27

## 2022-08-12 RX ADMIN — SODIUM CHLORIDE, PRESERVATIVE FREE 10 ML: 5 INJECTION INTRAVENOUS at 22:45

## 2022-08-12 RX ADMIN — SODIUM CHLORIDE, PRESERVATIVE FREE 10 ML: 5 INJECTION INTRAVENOUS at 05:50

## 2022-08-12 RX ADMIN — CITALOPRAM HYDROBROMIDE 20 MG: 20 TABLET ORAL at 08:28

## 2022-08-12 RX ADMIN — ENOXAPARIN SODIUM 40 MG: 100 INJECTION SUBCUTANEOUS at 08:27

## 2022-08-12 RX ADMIN — PANTOPRAZOLE SODIUM 40 MG: 40 TABLET, DELAYED RELEASE ORAL at 08:27

## 2022-08-12 RX ADMIN — LOPERAMIDE HYDROCHLORIDE 2 MG: 2 CAPSULE ORAL at 20:00

## 2022-08-12 NOTE — ROUTINE PROCESS
TRANSFER - OUT REPORT:    Verbal report given to abhi on Denys Ada  being transferred to ast or routine progression of care       Report consisted of patients Situation, Background, Assessment and   Recommendations(SBAR). Information from the following report(s) SBAR was reviewed with the receiving nurse. Lines:   Peripheral IV 08/11/22 Right Antecubital (Active)        Opportunity for questions and clarification was provided.       Patient transported with:   Registered Nurse

## 2022-08-12 NOTE — PROGRESS NOTES
CM reviewed chart and spoke with primary physician. Patient is pending stool samples, MRCP and GI consult per physician. Current plan is for patient to return home/self. CM will continue to follow.

## 2022-08-12 NOTE — CONSULTS
Consult    Patient: Anamaria Coates MRN: [id number]  SSN: [id number]    YOB: 1950  Age: 66 y.o. Sex: female      Subjective:      Anamaria Coates is a 66 y.o. female who is being seen for abnormal CT, dilated CBD  Patient who had history of gastric bypass surgery that presented to the emergency room for right upper quadrant abdominal pain. Patient had a cholecystectomy and gastric bypass over 10 years ago. Had intermittent abdominal discomfort more in the right upper quadrant with having very loose watery diarrhea.  n the ED abdominal ultrasound showed mildly dilated edition of the CBD with no evidence of stone. Laboratory data was significant for WBC of 9.7, lipase 212, bili total 0.4, ALT 49, AST 43, alkaline phosphatase 138. MRCP pending, GI consultation placed, she denied any fevers, chills, night sweats.   .        Past Medical History:   Diagnosis Date    Asthma     COVID 06/2021    Depression     GERD (gastroesophageal reflux disease)     H/O gastric bypass     Hypertension     Ill-defined condition     BRADYCARDIA    Pancreatitis     Skin cancer     precancerous    Sleep apnea      Past Surgical History:   Procedure Laterality Date    COLONOSCOPY N/A 6/7/2022    COLONOSCOPY performed by Laci Spencer MD at [facility] ENDOSCOPY    HX CHOLECYSTECTOMY      HX GI  2015    colonoscopy    HX GYN      partial hysterectomy    HX HERNIA REPAIR      HX UROLOGICAL        Family History   Problem Relation Age of Onset    Cancer Other         grandmother- colon cancer    Diabetes Other     Coronary Art Dis Other      Social History     Tobacco Use    Smoking status: Never    Smokeless tobacco: Never   Substance Use Topics    Alcohol use: Yes     Comment: wine socially       Current Facility-Administered Medications   Medication Dose Route Frequency Provider Last Rate Last Admin    sodium chloride (NS) flush 5-40 mL  5-40 mL IntraVENous Q8H Ginger Monroe PA-C   10 mL at 08/12/22 0550    sodium chloride (NS) flush 5-40 mL  5-40 mL IntraVENous PRN Ginger Monroe PA-C        acetaminophen (TYLENOL) tablet 650 mg  650 mg Oral Q6H PRN Ginger Monroe PA-C   650 mg at 08/12/22 7770    Or    acetaminophen (TYLENOL) suppository 650 mg  650 mg Rectal Q6H PRN Ginger Monroe PA-C        polyethylene glycol (MIRALAX) packet 17 g  17 g Oral DAILY PRN Ginger Monroe PA-C        ondansetron (ZOFRAN ODT) tablet 4 mg  4 mg Oral Q8H PRN Ginger Monroe PA-C        Or    ondansetron (ZOFRAN) injection 4 mg  4 mg IntraVENous Q6H PRN Ginger Monroe PA-C        enoxaparin (LOVENOX) injection 40 mg  40 mg SubCUTAneous DAILY Ginger Monroe PA-C   40 mg at 08/12/22 0827    citalopram (CELEXA) tablet 20 mg  20 mg Oral DAILY Ginger Monroe PA-C   20 mg at 08/12/22 7052    hydroCHLOROthiazide (HYDRODIURIL) tablet 12.5 mg  12.5 mg Oral DAILY Ginger Monroe PA-C   12.5 mg at 08/12/22 0827    losartan (COZAAR) tablet 50 mg  50 mg Oral DAILY Ginger Monroe PA-C   50 mg at 08/12/22 0827    pantoprazole (PROTONIX) tablet 40 mg  40 mg Oral DAILY Ginger Monroe PA-C   40 mg at 08/12/22 0827    traZODone (DESYREL) tablet 50 mg  50 mg Oral QHS Ginger Monroe PA-C            Allergies   Allergen Reactions    Codeine Other (comments)     Pt states \"It causes her to have Pancreatitis\"       Review of Systems:  Review of Systems   Constitutional:  Positive for malaise/fatigue. HENT: Negative. Eyes: Negative. Respiratory: Negative. Cardiovascular: Negative. Gastrointestinal:  Positive for abdominal pain. Negative for heartburn. Genitourinary: Negative. Neurological: Negative. Psychiatric/Behavioral: Negative.         Objective:     Vitals:    08/11/22 2200 08/12/22 0405 08/12/22 0752 08/12/22 1454   BP: (!) 146/73 (!) 150/71 (!) 142/70 120/67   Pulse: 70 62 63 73   Resp: 18 16 16 16   Temp: 98.9 °F (37.2 °C) 98.1 °F (36.7 °C) 98.2 °F (36.8 °C) 99.1 °F (37.3 °C)   SpO2: 98% 96% 97% 97%   Weight:       Height: Physical Exam:  Physical Exam  Constitutional:       Appearance: She is ill-appearing. HENT:      Head: Atraumatic. Mouth/Throat:      Mouth: Mucous membranes are dry. Eyes:      General: No scleral icterus. Cardiovascular:      Rate and Rhythm: Normal rate. Pulmonary:      Effort: Pulmonary effort is normal.   Abdominal:      General: Bowel sounds are normal.      Tenderness: There is abdominal tenderness. Musculoskeletal:         General: Normal range of motion. Cervical back: Neck supple. Skin:     General: Skin is warm.    Psychiatric:         Mood and Affect: Mood normal.        Recent Results (from the past 24 hour(s))   TROPONIN-HIGH SENSITIVITY    Collection Time: 08/11/22  4:57 PM   Result Value Ref Range    Troponin-High Sensitivity 13 0 - 51 ng/L   EKG, 12 LEAD, INITIAL    Collection Time: 08/11/22  6:27 PM   Result Value Ref Range    Ventricular Rate 75 BPM    Atrial Rate 75 BPM    P-R Interval 160 ms    QRS Duration 92 ms    Q-T Interval 422 ms    QTC Calculation (Bezet) 471 ms    Calculated P Axis 84 degrees    Calculated R Axis 45 degrees    Calculated T Axis 38 degrees    Diagnosis       Normal sinus rhythm  Normal ECG  When compared with ECG of 11-AUG-2022 14:45,  No significant change was found  Confirmed by NORA AGUILAR, Josse Sahu ([id number]) on 8/12/2022 84:61:03 AM     METABOLIC PANEL, BASIC    Collection Time: 08/12/22  5:45 AM   Result Value Ref Range    Sodium 142 136 - 145 mmol/L    Potassium 3.7 3.5 - 5.1 mmol/L    Chloride 106 97 - 108 mmol/L    CO2 31 21 - 32 mmol/L    Anion gap 5 5 - 15 mmol/L    Glucose 97 65 - 100 mg/dL    BUN 15 6 - 20 mg/dL    Creatinine 0.90 0.55 - 1.02 mg/dL    BUN/Creatinine ratio 17 12 - 20      GFR est AA >60 >60 ml/min/1.73m2    GFR est non-AA >60 >60 ml/min/1.73m2    Calcium 8.9 8.5 - 10.1 mg/dL   CBC WITH AUTOMATED DIFF    Collection Time: 08/12/22  5:45 AM   Result Value Ref Range    WBC 4.6 3.6 - 11.0 K/uL    RBC 3.87 3.80 - 5.20 M/uL    HGB 9.6 (L) 11.5 - 16.0 g/dL    HCT 31.0 (L) 35.0 - 47.0 %    MCV 80.1 80.0 - 99.0 FL    MCH 24.8 (L) 26.0 - 34.0 PG    MCHC 31.0 30.0 - 36.5 g/dL    RDW 15.6 (H) 11.5 - 14.5 %    PLATELET 653 635 - 214 K/uL    MPV 11.0 8.9 - 12.9 FL    NRBC 0.0 0.0  WBC    ABSOLUTE NRBC 0.00 0.00 - 0.01 K/uL    NEUTROPHILS 39 32 - 75 %    LYMPHOCYTES 49 12 - 49 %    MONOCYTES 10 5 - 13 %    EOSINOPHILS 2 0 - 7 %    BASOPHILS 0 0 - 1 %    IMMATURE GRANULOCYTES 0 0 - 0.5 %    ABS. NEUTROPHILS 1.8 1.8 - 8.0 K/UL    ABS. LYMPHOCYTES 2.2 0.8 - 3.5 K/UL    ABS. MONOCYTES 0.5 0.0 - 1.0 K/UL    ABS. EOSINOPHILS 0.1 0.0 - 0.4 K/UL    ABS. BASOPHILS 0.0 0.0 - 0.1 K/UL    ABS. IMM. GRANS. 0.0 0.00 - 0.04 K/UL    DF AUTOMATED     HEPATIC FUNCTION PANEL    Collection Time: 08/12/22  5:45 AM   Result Value Ref Range    Protein, total 5.7 (L) 6.4 - 8.2 g/dL    Albumin 3.0 (L) 3.5 - 5.0 g/dL    Globulin 2.7 2.0 - 4.0 g/dL    A-G Ratio 1.1 1.1 - 2.2      Bilirubin, total 0.4 0.2 - 1.0 mg/dL    Bilirubin, direct <0.1 0.0 - 0.2 mg/dL    Alk. phosphatase 130 (H) 45 - 117 U/L    AST (SGOT) 31 15 - 37 U/L    ALT (SGPT) 41 12 - 78 U/L   MRSA SCREEN - PCR (NASAL)    Collection Time: 08/12/22  5:45 AM   Result Value Ref Range    MRSA by PCR, Nasal DETECTED (A) Not Detected     C. DIFFICILE (DNA)    Collection Time: 08/12/22 12:00 PM   Result Value Ref Range    C. difficile (DNA) Negative Negative          MRI ABD W MRCP WO CONT   Final Result   Common bile duct and intrahepatic biliary tree are prominent due to   postcholecystectomy reservoir phenomenon. No evidence of choledocholithiasis. US RUQ   Final Result      1. Mild dilatation of the CBD which may be related to the history of   cholecystectomy. No evidence of CBD stone is given. 2. Normal sonographic appearance of the liver and the RIGHT kidney.          Assessment:     Hospital Problems  Date Reviewed: 1/4/2022            Codes Class Noted POA    Dilated bile duct ICD-10-CM: K83.8  ICD-9-CM: 576.8  8/11/2022 Unknown       Right abdominal pain, with diarrhea, unknown etiology,  No signs of GI bleeding, bowel obstruction,  Had a history of cholecystectomy, gastric bypass,  CBD dilation on the ultrasound, likely due to the cholecystectomy,  Bilirubin was normal, no signs of CBD obstruction  Plan:   Continue current symptomatic treatment  IV hydrations  Follow MRCP report    No ERCP, EUS needed at this time  Signed By: Prasanth David MD     August 12, 2022         Thank you for allowing me to participate in this patients care  Cc Referring Physician   Mazin Peters NP

## 2022-08-12 NOTE — PROGRESS NOTES
OT eval order received and acknowledged. Pt screened and demonstrating baseline independence for self care tasks and functional mobility/transfers. Pt observed to ambulate within room [facility] w/ no LOB or unsteadiness. Pt reports she has been ambulating to the bathroom w/ no trouble. She reports no need for skilled OT services at this time. OT evaluation order will therefore be discontinued this pt has no acute OT needs. Please reorder OT if pt's functional status changes. Thank you.

## 2022-08-12 NOTE — PROGRESS NOTES
PT eval order received and acknowledged. Pt screened and is currently presenting at  baseline I for functional mobility/transfers. Pt visualized amb in room without AD, pt I for all transfers and amb at this time. PT evaluation order will be discontinued at this time as pt has no acute PT needs. Please reorder PT if pt functional status changes. Thank you.

## 2022-08-12 NOTE — PROGRESS NOTES
Hospitalist Progress Note    Subjective:   Daily Progress Note: 8/12/2022 8:08 AM    Hospital Course: Patient is a  66 y.o. female with a past medical history of hypertension, depression, GERD, history of gastric bypass surgery that presented to the emergency room on 8/11/2022 for right upper quadrant abdominal pain. Patient had a cholecystectomy and gastric bypass over 10 years ago. Patient says for approximately 1 month she has been having very loose watery diarrhea. She also admits to right upper quadrant pain and soreness that she thinks is related due to eating after 1 hour. She denied any fevers, chills, night sweats. In the ED abdominal ultrasound showed mildly dilated edition of the CBD with no evidence of stone. Laboratory data was significant for WBC of 9.7, lipase 212, bili total 0.4, ALT 49, AST 43, alkaline phosphatase 138. Troponin x2 negative. EKG unremarkable. Stool samples sent and pending. GI consulted. MRCP pending      Subjective: Patient said that she had several loose stools yesterday. Samples were collected and sent to the lab.   Denies any fever, chills, night sweats    Current Facility-Administered Medications   Medication Dose Route Frequency    sodium chloride (NS) flush 5-40 mL  5-40 mL IntraVENous Q8H    sodium chloride (NS) flush 5-40 mL  5-40 mL IntraVENous PRN    acetaminophen (TYLENOL) tablet 650 mg  650 mg Oral Q6H PRN    Or    acetaminophen (TYLENOL) suppository 650 mg  650 mg Rectal Q6H PRN    polyethylene glycol (MIRALAX) packet 17 g  17 g Oral DAILY PRN    ondansetron (ZOFRAN ODT) tablet 4 mg  4 mg Oral Q8H PRN    Or    ondansetron (ZOFRAN) injection 4 mg  4 mg IntraVENous Q6H PRN    enoxaparin (LOVENOX) injection 40 mg  40 mg SubCUTAneous DAILY    citalopram (CELEXA) tablet 20 mg  20 mg Oral DAILY    hydroCHLOROthiazide (HYDRODIURIL) tablet 12.5 mg  12.5 mg Oral DAILY    losartan (COZAAR) tablet 50 mg  50 mg Oral DAILY    pantoprazole (PROTONIX) tablet 40 mg  40 mg Oral DAILY    traZODone (DESYREL) tablet 50 mg  50 mg Oral QHS        Review of Systems  Constitutional: No fevers, No chills, No sweats, No fatigue, No Weakness  Eyes: No redness  Ears, nose, mouth, throat, and face: No nasal congestion, No sore throat, No voice change  Respiratory: No Shortness of Breath, No cough, No wheezing  Cardiovascular: No chest pain, No palpitations, No extremity edema  Gastrointestinal: No nausea, No vomiting, +diarrhea, No abdominal pain  Genitourinary: No frequency, No dysuria, No hematuria  Integument/breast: No skin lesion(s)   Neurological: No Confusion, No headaches, No dizziness      Objective:     Visit Vitals  BP (!) 142/70 (BP 1 Location: Left upper arm, BP Patient Position: Sitting)   Pulse 63   Temp 98.2 °F (36.8 °C)   Resp 16   Ht 5' 3\" (1.6 m)   Wt 74.8 kg (165 lb)   SpO2 97%   Breastfeeding No   BMI 29.23 kg/m²      O2 Device: None (Room air)    Temp (24hrs), Av.3 °F (36.8 °C), Min:97.6 °F (36.4 °C), Max:98.9 °F (37.2 °C)      No intake/output data recorded. No intake/output data recorded. PHYSICAL EXAM:  Constitutional: No acute distress  Skin: Extremities and face reveal no rashes. HEENT: Sclerae anicteric. Extra-occular muscles are intact. No oral ulcers. The neck is supple and no masses. Cardiovascular: Regular rate and rhythm. Respiratory:  Clear breath sounds bilaterally with no wheezes, rales, or rhonchi. GI: Abdomen nondistended, soft, and nontender. Normal active bowel sounds. Musculoskeletal: No pitting edema of the lower legs. Able to move all ext  Neurological:  Patient is alert and oriented.  Cranial nerves II-XII grossly intact  Psychiatric: Mood appears appropriate       Data Review    Recent Results (from the past 24 hour(s))   CBC WITH AUTOMATED DIFF    Collection Time: 22 12:46 PM   Result Value Ref Range    WBC 4.4 3.6 - 11.0 K/uL    RBC 3.88 3.80 - 5.20 M/uL    HGB 9.7 (L) 11.5 - 16.0 g/dL    HCT 31.0 (L) 35.0 - 47.0 %    MCV 79.9 (L) 80.0 - 99.0 FL    MCH 25.0 (L) 26.0 - 34.0 PG    MCHC 31.3 30.0 - 36.5 g/dL    RDW 15.6 (H) 11.5 - 14.5 %    PLATELET 814 143 - 195 K/uL    MPV 10.5 8.9 - 12.9 FL    NRBC 0.0 0.0  WBC    ABSOLUTE NRBC 0.00 0.00 - 0.01 K/uL    NEUTROPHILS 47 32 - 75 %    LYMPHOCYTES 40 12 - 49 %    MONOCYTES 10 5 - 13 %    EOSINOPHILS 2 0 - 7 %    BASOPHILS 1 0 - 1 %    IMMATURE GRANULOCYTES 0 0 - 0.5 %    ABS. NEUTROPHILS 2.1 1.8 - 8.0 K/UL    ABS. LYMPHOCYTES 1.8 0.8 - 3.5 K/UL    ABS. MONOCYTES 0.4 0.0 - 1.0 K/UL    ABS. EOSINOPHILS 0.1 0.0 - 0.4 K/UL    ABS. BASOPHILS 0.0 0.0 - 0.1 K/UL    ABS. IMM. GRANS. 0.0 0.00 - 0.04 K/UL    DF AUTOMATED     METABOLIC PANEL, COMPREHENSIVE    Collection Time: 08/11/22 12:46 PM   Result Value Ref Range    Sodium 142 136 - 145 mmol/L    Potassium 4.2 3.5 - 5.1 mmol/L    Chloride 109 (H) 97 - 108 mmol/L    CO2 31 21 - 32 mmol/L    Anion gap 2 (L) 5 - 15 mmol/L    Glucose 103 (H) 65 - 100 mg/dL    BUN 17 6 - 20 mg/dL    Creatinine 0.94 0.55 - 1.02 mg/dL    BUN/Creatinine ratio 18 12 - 20      GFR est AA >60 >60 ml/min/1.73m2    GFR est non-AA 58 (L) >60 ml/min/1.73m2    Calcium 8.8 8.5 - 10.1 mg/dL    Bilirubin, total 0.4 0.2 - 1.0 mg/dL    AST (SGOT) 43 (H) 15 - 37 U/L    ALT (SGPT) 49 12 - 78 U/L    Alk.  phosphatase 138 (H) 45 - 117 U/L    Protein, total 6.3 (L) 6.4 - 8.2 g/dL    Albumin 3.0 (L) 3.5 - 5.0 g/dL    Globulin 3.3 2.0 - 4.0 g/dL    A-G Ratio 0.9 (L) 1.1 - 2.2     TYPE & SCREEN    Collection Time: 08/11/22 12:46 PM   Result Value Ref Range    Crossmatch Expiration 08/14/2022,2359     ABO/Rh(D) O Positive     Antibody screen Negative    LIPASE    Collection Time: 08/11/22 12:46 PM   Result Value Ref Range    Lipase 212 73 - 393 U/L   TROPONIN-HIGH SENSITIVITY    Collection Time: 08/11/22 12:46 PM   Result Value Ref Range    Troponin-High Sensitivity 10 0 - 51 ng/L   EKG, 12 LEAD, INITIAL    Collection Time: 08/11/22  2:45 PM   Result Value Ref Range    Ventricular Rate 60 BPM    Atrial Rate 60 BPM    P-R Interval 156 ms    QRS Duration 84 ms    Q-T Interval 446 ms    QTC Calculation (Bezet) 446 ms    Calculated P Axis 78 degrees    Calculated R Axis 54 degrees    Calculated T Axis 54 degrees    Diagnosis       Normal sinus rhythm  Normal ECG  When compared with ECG of 29-JAN-2018 11:07,  No significant change was found  Confirmed by NORA AGUILAR, [facility] ([id number]) on 8/11/2022 5:58:17 PM     TROPONIN-HIGH SENSITIVITY    Collection Time: 08/11/22  4:57 PM   Result Value Ref Range    Troponin-High Sensitivity 13 0 - 51 ng/L   CBC WITH AUTOMATED DIFF    Collection Time: 08/12/22  5:45 AM   Result Value Ref Range    WBC 4.6 3.6 - 11.0 K/uL    RBC 3.87 3.80 - 5.20 M/uL    HGB 9.6 (L) 11.5 - 16.0 g/dL    HCT 31.0 (L) 35.0 - 47.0 %    MCV 80.1 80.0 - 99.0 FL    MCH 24.8 (L) 26.0 - 34.0 PG    MCHC 31.0 30.0 - 36.5 g/dL    RDW 15.6 (H) 11.5 - 14.5 %    PLATELET 773 814 - 149 K/uL    MPV 11.0 8.9 - 12.9 FL    NRBC 0.0 0.0  WBC    ABSOLUTE NRBC 0.00 0.00 - 0.01 K/uL    NEUTROPHILS 39 32 - 75 %    LYMPHOCYTES 49 12 - 49 %    MONOCYTES 10 5 - 13 %    EOSINOPHILS 2 0 - 7 %    BASOPHILS 0 0 - 1 %    IMMATURE GRANULOCYTES 0 0 - 0.5 %    ABS. NEUTROPHILS 1.8 1.8 - 8.0 K/UL    ABS. LYMPHOCYTES 2.2 0.8 - 3.5 K/UL    ABS. MONOCYTES 0.5 0.0 - 1.0 K/UL    ABS. EOSINOPHILS 0.1 0.0 - 0.4 K/UL    ABS. BASOPHILS 0.0 0.0 - 0.1 K/UL    ABS. IMM. GRANS. 0.0 0.00 - 0.04 K/UL    DF AUTOMATED       Assessment:   1. Dilated common bile duct/right upper quadrant abdominal pain  2. Hypertension with hypotensive episodes  3. GERD  4. Depression/anxiety       Plan:    1. Abdominal ultrasound showed dilated common bile duct with no stone. Bili total within normal limits. Liver function tests are slightly elevated with an AST of 43 and alkaline phosphatase of 138. Lipase within normal limits. MRCP pending. Gastroenterology consulted. Stool samples pending  2.   Blood pressure is on the low to normal side and bradycardic. We will hold patient's beta-blocker. On losartan and hydrochlorothiazide with holding parameters. Continue to monitor per unit protocol. On cardiac telemetry. troponin x2 negative. EKG unremarkable  3. Prilosec  4. Celexa  5. CBC, CMP in a.m. Dispo: 24-48 hours. Barriers include results of stool samples, MRCP, GI consult, pain management. Suspect patient may require home health at time of discharge     CODE STATUS full     DVT prophylaxis: Lovenox  Ulcer prophylaxis: [address] discussed with: Patient/Family, Nurse, and     Total time spent with patient: 34 minutes.

## 2022-08-13 VITALS
TEMPERATURE: 97.8 F | RESPIRATION RATE: 16 BRPM | SYSTOLIC BLOOD PRESSURE: 130 MMHG | HEIGHT: 63 IN | BODY MASS INDEX: 29.23 KG/M2 | DIASTOLIC BLOOD PRESSURE: 71 MMHG | WEIGHT: 165 LBS | HEART RATE: 71 BPM | OXYGEN SATURATION: 98 %

## 2022-08-13 PROCEDURE — 74011250637 HC RX REV CODE- 250/637: Performed by: PHYSICIAN ASSISTANT

## 2022-08-13 PROCEDURE — 74011000250 HC RX REV CODE- 250: Performed by: PHYSICIAN ASSISTANT

## 2022-08-13 RX ORDER — LOPERAMIDE HYDROCHLORIDE 2 MG/1
2 CAPSULE ORAL
Qty: 30 CAPSULE | Refills: 0 | Status: SHIPPED | OUTPATIENT
Start: 2022-08-13 | End: 2022-08-23

## 2022-08-13 RX ORDER — TRAMADOL HYDROCHLORIDE 50 MG/1
50 TABLET ORAL
Qty: 15 TABLET | Refills: 0 | Status: SHIPPED | OUTPATIENT
Start: 2022-08-13 | End: 2022-08-18

## 2022-08-13 RX ORDER — ONDANSETRON 4 MG/1
4 TABLET, ORALLY DISINTEGRATING ORAL
Qty: 21 TABLET | Refills: 0 | Status: SHIPPED | OUTPATIENT
Start: 2022-08-13 | End: 2022-08-20

## 2022-08-13 RX ADMIN — SODIUM CHLORIDE, PRESERVATIVE FREE 10 ML: 5 INJECTION INTRAVENOUS at 06:18

## 2022-08-13 RX ADMIN — LOSARTAN POTASSIUM 50 MG: 50 TABLET, FILM COATED ORAL at 08:56

## 2022-08-13 RX ADMIN — CITALOPRAM HYDROBROMIDE 20 MG: 20 TABLET ORAL at 08:56

## 2022-08-13 RX ADMIN — HYDROCHLOROTHIAZIDE 12.5 MG: 25 TABLET ORAL at 08:56

## 2022-08-13 RX ADMIN — LOPERAMIDE HYDROCHLORIDE 2 MG: 2 CAPSULE ORAL at 10:04

## 2022-08-13 RX ADMIN — PANTOPRAZOLE SODIUM 40 MG: 40 TABLET, DELAYED RELEASE ORAL at 08:56

## 2022-08-13 NOTE — PROGRESS NOTES
CM noted discharge order. Patient has no CM needs at discharge. Patient will be going home/self. Primary RN made aware. Discharge plan of care/case management plan validated with provider discharge order.

## 2022-08-13 NOTE — DISCHARGE INSTRUCTIONS
Hospitalist Discharge Instructions     Patient ID:    Bhakti Berrios  [id number]  66 y.o.  1950    Admit date: 8/11/2022    Discharge date : 8/13/2022    Final Diagnoses:   1. Dilated common bile duct/post cholecystectomy reservoir phenomenon  2. Hypertension with hypotensive episodes  3. GERD  4. Depression/anxiety       Reason for Hospitalization: Right upper quadrant abdominal pain      Discharge Medications:   Current Discharge Medication List        START taking these medications    Details   loperamide (IMODIUM) 2 mg capsule Take 1 Capsule by mouth every six (6) hours as needed for Diarrhea for up to 10 days. Qty: 30 Capsule, Refills: 0  Start date: 8/13/2022, End date: 8/23/2022      ondansetron (ZOFRAN ODT) 4 mg disintegrating tablet Take 1 Tablet by mouth every four to six (4-6) hours as needed for Nausea or Vomiting for up to 7 days. Qty: 21 Tablet, Refills: 0  Start date: 8/13/2022, End date: 8/20/2022      traMADoL (Ultram) 50 mg tablet Take 1 Tablet by mouth every six (6) hours as needed for Pain for up to 5 days. Max Daily Amount: 200 mg. Qty: 15 Tablet, Refills: 0  Start date: 8/13/2022, End date: 8/18/2022    Associated Diagnoses: RUQ pain           CONTINUE these medications which have NOT CHANGED    Details   albuterol (PROVENTIL HFA, VENTOLIN HFA, PROAIR HFA) 90 mcg/actuation inhaler Take 2 Puffs by inhalation every six (6) hours as needed for Wheezing. losartan (COZAAR) 50 mg tablet Take 50 mg by mouth daily. hydroCHLOROthiazide (MICROZIDE) 12.5 mg capsule Take 12.5 mg by mouth daily. citalopram (CELEXA) 40 mg tablet Take 40 mg by mouth in the morning. omeprazole (PRILOSEC) 40 mg capsule Take 40 mg by mouth daily. Follow up Care:    1. Chris Saleh NP in 1-2 weeks.       Follow-up Information       Follow up With Specialties Details Why Contact Info    Chris Saleh NP Nurse Practitioner Follow up in 2 week([phone] [address] [facility] [address] [facility]      Laci Spencer MD Gastroenterology Follow up in 2 week(s)  [address]  [address] [address] [phone]                Patient Follow Up Instructions:    Activity: Activity as tolerated  Diet:  Regular Diet  Wound care: None Needed    Condition at Discharge:  Stable  __________________________________________________________________    Disposition  Home with family, no needs  ____________________________________________________________________    Code Status: Full Code  ___________________________________________________________________    CONSULTATIONS: GI    Signed:  Bruce Simeon PA-C  8/13/2022  8:16 AM

## 2022-08-13 NOTE — DISCHARGE SUMMARY
Hospitalist Discharge Summary     Patient ID:    Malachi Rubi  [id number]  66 y.o.  1950    Admit date: 8/11/2022    Discharge date : 8/13/2022    Chronic Diagnoses:    Problem List as of 8/13/2022 Date Reviewed: 1/4/2022            Codes Class Noted - Resolved    Dilated bile duct ICD-10-CM: K83.8  ICD-9-CM: 576.8  8/11/2022 - Present       22    Final Diagnoses:   1. Dilated common bile duct/post cholecystectomy reservoir phenomenon  2. Hypertension with hypotensive episodes  3. GERD  4. Depression/anxiety       Reason for Hospitalization: Right upper quadrant abdominal pain      Hospital Course:  Patient is a  66 y.o. female with a past medical history of hypertension, depression, GERD, history of gastric bypass surgery that presented to the emergency room on 8/11/2022 for right upper quadrant abdominal pain. Patient had a cholecystectomy and gastric bypass over 10 years ago. Patient says for approximately 1 month she has been having very loose watery diarrhea. She also admits to right upper quadrant pain and soreness that she thinks is related due to eating after 1 hour. She denied any fevers, chills, night sweats. In the ED abdominal ultrasound showed mildly dilated edition of the CBD with no evidence of stone. Laboratory data was significant for WBC of 9.7, lipase 212, bili total 0.4, ALT 49, AST 43, alkaline phosphatase 138. Troponin x2 negative. EKG unremarkable. GI consulted. MRCP shows common bile duct and intrahepatic biliary tree are prominent due to postcholecystectomy reservoir phenomenal with no evidence of choledocholithiasis. Patient's liver function tests are stable and trending down. Bilirubin within normal limits. Patient is tolerating oral diet. Stool samples negative for C. difficile and other bacterial infections. Imodium is helping.   It was felt patient was stable for discharge with close follow-up with GI    Discharge Medications:   Current Discharge Medication List        START taking these medications    Details   loperamide (IMODIUM) 2 mg capsule Take 1 Capsule by mouth every six (6) hours as needed for Diarrhea for up to 10 days. Qty: 30 Capsule, Refills: 0  Start date: 8/13/2022, End date: 8/23/2022      ondansetron (ZOFRAN ODT) 4 mg disintegrating tablet Take 1 Tablet by mouth every four to six (4-6) hours as needed for Nausea or Vomiting for up to 7 days. Qty: 21 Tablet, Refills: 0  Start date: 8/13/2022, End date: 8/20/2022      traMADoL (Ultram) 50 mg tablet Take 1 Tablet by mouth every six (6) hours as needed for Pain for up to 5 days. Max Daily Amount: 200 mg. Qty: 15 Tablet, Refills: 0  Start date: 8/13/2022, End date: 8/18/2022    Associated Diagnoses: RUQ pain           CONTINUE these medications which have NOT CHANGED    Details   albuterol (PROVENTIL HFA, VENTOLIN HFA, PROAIR HFA) 90 mcg/actuation inhaler Take 2 Puffs by inhalation every six (6) hours as needed for Wheezing. losartan (COZAAR) 50 mg tablet Take 50 mg by mouth daily. hydroCHLOROthiazide (MICROZIDE) 12.5 mg capsule Take 12.5 mg by mouth daily. citalopram (CELEXA) 40 mg tablet Take 40 mg by mouth in the morning. omeprazole (PRILOSEC) 40 mg capsule Take 40 mg by mouth daily. Follow up Care:    1. Radha Hopkins NP in 1-2 weeks. Follow-up Information       Follow up With Specialties Details Why Contact Info    Radha Hopkins NP Nurse Practitioner Follow up in 2 week(s)  [address]  [address],[facility]      Ethel Castro MD Gastroenterology Follow up in 2 week(s)  [address]  [phone]                Patient Follow Up Instructions:    Activity: Activity as tolerated  Diet:  Regular Diet  Wound care: None Needed    Condition at Discharge:  Stable  __________________________________________________________________    Disposition  Home with family, no needs  ____________________________________________________________________    Code Status: Full Code  ___________________________________________________________________    Discharge Exam:  Patient seen and examined by me on discharge day. Pertinent Findings:  Gen:    Not in distress  Chest: Clear lungs  CVS:   Regular rhythm. No edema  Abd:  Soft, not distended, not tender  Neuro:  Alert    CONSULTATIONS: GI    Significant Diagnostic Studies:   8/11/2022: BUN 17 mg/dL (Ref range: 6 - 20 mg/dL); Calcium 8.8 mg/dL (Ref range: 8.5 - 10.1 mg/dL); CO2 31 mmol/L (Ref range: 21 - 32 mmol/L); Creatinine 0.94 mg/dL (Ref range: 0.55 - 1.02 mg/dL); Glucose 103 mg/dL (H; Ref range: 65 - 100 mg/dL); HCT 31.0 % (L; Ref range: 35.0 - 47.0 %); HGB 9.7 g/dL (L; Ref range: 11.5 - 16.0 g/dL); Potassium 4.2 mmol/L (Ref range: 3.5 - 5.1 mmol/L); Sodium 142 mmol/L (Ref range: 136 - 145 mmol/L)  8/12/2022: BUN 15 mg/dL (Ref range: 6 - 20 mg/dL); Calcium 8.9 mg/dL (Ref range: 8.5 - 10.1 mg/dL); CO2 31 mmol/L (Ref range: 21 - 32 mmol/L); Creatinine 0.90 mg/dL (Ref range: 0.55 - 1.02 mg/dL); Glucose 97 mg/dL (Ref range: 65 - 100 mg/dL); HCT 31.0 % (L; Ref range: 35.0 - 47.0 %); HGB 9.6 g/dL (L; Ref range: 11.5 - 16.0 g/dL); Potassium 3.7 mmol/L (Ref range: 3.5 - 5.1 mmol/L); Sodium 142 mmol/L (Ref range: 136 - 145 mmol/L)  Recent Labs     08/12/22  0545 08/11/22  1246   WBC 4.6 4.4   HGB 9.6* 9.7*   HCT 31.0* 31.0*    359     Recent Labs     08/12/22  0545 08/11/22  1246    142   K 3.7 4.2    109*   CO2 31 31   BUN 15 17   CREA 0.90 0.94   GLU 97 103*   CA 8.9 8.8     Recent Labs     08/12/22  0545 08/11/22  1246   ALT 41 49   * 138*   TBILI 0.4 0.4   TP 5.7* 6.3*   ALB 3.0* 3.0*   GLOB 2.7 3.3   LPSE  --  212     No results for input(s): INR, PTP, APTT, INREXT in the last 72 hours. No results for input(s): FE, TIBC, PSAT, FERR in the last 72 hours.    No results for input(s): PH, PCO2, PO2 in the last 72 hours. No results for input(s): CPK, CKMB in the last 72 hours.     No lab exists for component: TROPONINI  No results found for: GLUCPOC      Total Time: >30 min     Signed:  Mi Timmons PA-C  8/13/2022  8:16 AM

## 2022-08-13 NOTE — ROUTINE PROCESS
Patient discharged home self care per attending. Patient given instructions regarding follow up care and medications.

## 2022-08-22 NOTE — PROGRESS NOTES
Physician Progress Note      PATIENT:               Jermaine Adamson  CSN #:                  [id number]  :                       1950  ADMIT DATE:       2022 12:29 PM  [address] [place] DATE:        2022 11:11 AM  RESPONDING  PROVIDER #:        Nicolette GUSTAFSON PA-C          QUERY TEXT:    Patient admitted with abdominal pain. Noted documentation of depression in  H&P. Please document in progress notes and discharge summary if you are treating/evaluating the following: The medical record reflects the following:  Risk Factors: 66year old female, history of depression  Clinical Indicators:  H&P - depression / anxiety  Treatment: Celexa 20mg continued from home medications      Please email [email or web address] with any questions  Options provided:  -- Major depression, recurrent: mild  -- Major depression, recurrent: moderate  -- Major depression, recurrent:  severe without psychotic features  -- Major depression, recurrent: severe with psychotic features  -- Major depression, single episode: mild  -- Major depression, single episode: moderate  -- Major depression, single episode: severe without psychotic features  -- Major depression, single episode: severe with psychotic features  -- Major depression, single episode: unspecified  -- Other - I will add my own diagnosis  -- Disagree - Not applicable / Not valid  -- Disagree - Clinically unable to determine / Unknown  -- Refer to Clinical Documentation Reviewer    PROVIDER RESPONSE TEXT:    This patient has unspecified depression, single episode. Query created by:  Prasanna Stacy on 2022 8:55 AM      Electronically signed by:  Fazal Canales PA-C 2022 8:04 AM

## 2022-08-23 LAB
O+P SPEC MICRO: NORMAL
O+P STL CONC: NORMAL
SPECIMEN SOURCE: NORMAL

## 2022-09-20 ENCOUNTER — ANESTHESIA (OUTPATIENT)
Dept: ENDOSCOPY | Age: 72
End: 2022-09-20
Payer: MEDICARE

## 2022-09-20 ENCOUNTER — HOSPITAL ENCOUNTER (OUTPATIENT)
Age: 72
Setting detail: OUTPATIENT SURGERY
Discharge: HOME | End: 2022-09-20
Attending: INTERNAL MEDICINE | Admitting: INTERNAL MEDICINE
Payer: MEDICARE

## 2022-09-20 ENCOUNTER — APPOINTMENT (OUTPATIENT)
Dept: ENDOSCOPY | Age: 72
End: 2022-09-20
Attending: INTERNAL MEDICINE
Payer: MEDICARE

## 2022-09-20 ENCOUNTER — ANESTHESIA EVENT (OUTPATIENT)
Dept: ENDOSCOPY | Age: 72
End: 2022-09-20
Payer: MEDICARE

## 2022-09-20 VITALS
HEART RATE: 61 BPM | TEMPERATURE: 98 F | RESPIRATION RATE: 18 BRPM | DIASTOLIC BLOOD PRESSURE: 71 MMHG | BODY MASS INDEX: 31.03 KG/M2 | WEIGHT: 168.6 LBS | OXYGEN SATURATION: 93 % | HEIGHT: 62 IN | SYSTOLIC BLOOD PRESSURE: 130 MMHG

## 2022-09-20 PROCEDURE — 76040000007: Performed by: INTERNAL MEDICINE

## 2022-09-20 PROCEDURE — 74011000250 HC RX REV CODE- 250: Performed by: INTERNAL MEDICINE

## 2022-09-20 PROCEDURE — 74011250636 HC RX REV CODE- 250/636: Performed by: NURSE PRACTITIONER

## 2022-09-20 PROCEDURE — 76060000032 HC ANESTHESIA 0.5 TO 1 HR: Performed by: INTERNAL MEDICINE

## 2022-09-20 PROCEDURE — 74011250637 HC RX REV CODE- 250/637: Performed by: INTERNAL MEDICINE

## 2022-09-20 PROCEDURE — 2709999900 HC NON-CHARGEABLE SUPPLY: Performed by: INTERNAL MEDICINE

## 2022-09-20 PROCEDURE — 74011250636 HC RX REV CODE- 250/636: Performed by: INTERNAL MEDICINE

## 2022-09-20 PROCEDURE — 74011000250 HC RX REV CODE- 250: Performed by: NURSE PRACTITIONER

## 2022-09-20 RX ORDER — SODIUM CHLORIDE 0.9 % (FLUSH) 0.9 %
5-40 SYRINGE (ML) INJECTION EVERY 8 HOURS
Status: CANCELLED | OUTPATIENT
Start: 2022-09-20

## 2022-09-20 RX ORDER — ACETAMINOPHEN 500 MG
1000 TABLET ORAL ONCE
Status: COMPLETED | OUTPATIENT
Start: 2022-09-20 | End: 2022-09-20

## 2022-09-20 RX ORDER — PROPOFOL 10 MG/ML
INJECTION, EMULSION INTRAVENOUS AS NEEDED
Status: DISCONTINUED | OUTPATIENT
Start: 2022-09-20 | End: 2022-09-20 | Stop reason: HOSPADM

## 2022-09-20 RX ORDER — SODIUM CHLORIDE, SODIUM LACTATE, POTASSIUM CHLORIDE, CALCIUM CHLORIDE 600; 310; 30; 20 MG/100ML; MG/100ML; MG/100ML; MG/100ML
INJECTION, SOLUTION INTRAVENOUS
Status: DISCONTINUED | OUTPATIENT
Start: 2022-09-20 | End: 2022-09-20 | Stop reason: HOSPADM

## 2022-09-20 RX ORDER — FENTANYL CITRATE 50 UG/ML
INJECTION, SOLUTION INTRAMUSCULAR; INTRAVENOUS AS NEEDED
Status: DISCONTINUED | OUTPATIENT
Start: 2022-09-20 | End: 2022-09-20 | Stop reason: HOSPADM

## 2022-09-20 RX ORDER — LIDOCAINE HYDROCHLORIDE 20 MG/ML
JELLY TOPICAL ONCE
Status: COMPLETED | OUTPATIENT
Start: 2022-09-20 | End: 2022-09-20

## 2022-09-20 RX ORDER — SODIUM CHLORIDE 0.9 % (FLUSH) 0.9 %
5-40 SYRINGE (ML) INJECTION AS NEEDED
Status: CANCELLED | OUTPATIENT
Start: 2022-09-20

## 2022-09-20 RX ORDER — LIDOCAINE HYDROCHLORIDE 20 MG/ML
INJECTION, SOLUTION EPIDURAL; INFILTRATION; INTRACAUDAL; PERINEURAL AS NEEDED
Status: DISCONTINUED | OUTPATIENT
Start: 2022-09-20 | End: 2022-09-20 | Stop reason: HOSPADM

## 2022-09-20 RX ORDER — SODIUM CHLORIDE, SODIUM LACTATE, POTASSIUM CHLORIDE, CALCIUM CHLORIDE 600; 310; 30; 20 MG/100ML; MG/100ML; MG/100ML; MG/100ML
75 INJECTION, SOLUTION INTRAVENOUS CONTINUOUS
Status: DISCONTINUED | OUTPATIENT
Start: 2022-09-20 | End: 2022-09-20 | Stop reason: HOSPADM

## 2022-09-20 RX ORDER — SODIUM CHLORIDE 9 MG/ML
25 INJECTION, SOLUTION INTRAVENOUS CONTINUOUS
Status: DISCONTINUED | OUTPATIENT
Start: 2022-09-20 | End: 2022-09-20 | Stop reason: HOSPADM

## 2022-09-20 RX ADMIN — FENTANYL CITRATE 50 MCG: 50 INJECTION, SOLUTION INTRAMUSCULAR; INTRAVENOUS at 08:43

## 2022-09-20 RX ADMIN — LIDOCAINE HYDROCHLORIDE 1 ML: 20 JELLY TOPICAL at 09:20

## 2022-09-20 RX ADMIN — SODIUM CHLORIDE, POTASSIUM CHLORIDE, SODIUM LACTATE AND CALCIUM CHLORIDE 75 ML/HR: 600; 310; 30; 20 INJECTION, SOLUTION INTRAVENOUS at 08:00

## 2022-09-20 RX ADMIN — SODIUM CHLORIDE, POTASSIUM CHLORIDE, SODIUM LACTATE AND CALCIUM CHLORIDE: 600; 310; 30; 20 INJECTION, SOLUTION INTRAVENOUS at 08:37

## 2022-09-20 RX ADMIN — PROPOFOL 50 MG: 10 INJECTION, EMULSION INTRAVENOUS at 08:46

## 2022-09-20 RX ADMIN — PROPOFOL 50 MG: 10 INJECTION, EMULSION INTRAVENOUS at 08:43

## 2022-09-20 RX ADMIN — ACETAMINOPHEN 1000 MG: 500 TABLET ORAL at 09:54

## 2022-09-20 RX ADMIN — LIDOCAINE HYDROCHLORIDE 100 MG: 20 INJECTION, SOLUTION EPIDURAL; INFILTRATION; INTRACAUDAL; PERINEURAL at 08:43

## 2022-09-20 NOTE — ANESTHESIA POSTPROCEDURE EVALUATION
Procedure(s): HEMORRHOID EXCISION. MAC    Anesthesia Post Evaluation      Multimodal analgesia: multimodal analgesia not used between 6 hours prior to anesthesia start to PACU discharge  Patient location during evaluation: bedside (Endoscopy suite)  Patient participation: complete - patient cannot participate  Level of consciousness: sleepy but conscious  Pain score: 0  Pain management: adequate  Airway patency: patent  Anesthetic complications: no  Cardiovascular status: acceptable  Respiratory status: acceptable and nasal cannula  Hydration status: acceptable  Comments: This patient remained on the stretcher. The patient was handed off to the endoscopy nursing team.  All questions regarding pre-, intra-, and postoperative care were answered. Post anesthesia nausea and vomiting:  none      INITIAL Post-op Vital signs: No vitals data found for the desired time range.

## 2022-09-20 NOTE — ANESTHESIA PREPROCEDURE EVALUATION
Relevant Problems   No relevant active problems       Anesthetic History   No history of anesthetic complications            Review of Systems / Medical History  Patient summary reviewed, nursing notes reviewed and pertinent labs reviewed    Pulmonary        Sleep apnea    Asthma     Comments: Uses mouth piece for sleep apnea.    Neuro/Psych         Psychiatric history     Cardiovascular    Hypertension: well controlled                   GI/Hepatic/Renal     GERD          Comments: Pancreatitis  Hx gastric bypass Endo/Other        Obesity     Other Findings            Physical Exam    Airway  Mallampati: II    Neck ROM: normal range of motion        Cardiovascular    Rhythm: regular  Rate: normal         Dental    Dentition: Caps/crowns     Pulmonary  Breath sounds clear to auscultation               Abdominal         Other Findings            Anesthetic Plan    ASA: 3  Anesthesia type: MAC          Induction: Intravenous  Anesthetic plan and risks discussed with: Patient
